# Patient Record
Sex: MALE | Race: WHITE | NOT HISPANIC OR LATINO | URBAN - METROPOLITAN AREA
[De-identification: names, ages, dates, MRNs, and addresses within clinical notes are randomized per-mention and may not be internally consistent; named-entity substitution may affect disease eponyms.]

---

## 2017-09-19 PROBLEM — Z00.00 ENCOUNTER FOR PREVENTIVE HEALTH EXAMINATION: Status: ACTIVE | Noted: 2017-09-19

## 2018-09-19 ENCOUNTER — INPATIENT (INPATIENT)
Facility: HOSPITAL | Age: 73
LOS: 1 days | Discharge: HOME | DRG: 378 | End: 2018-09-21
Attending: HOSPITALIST | Admitting: HOSPITALIST
Payer: MEDICARE

## 2018-09-19 VITALS — WEIGHT: 210.1 LBS | HEIGHT: 68 IN

## 2018-09-19 DIAGNOSIS — K92.2 GASTROINTESTINAL HEMORRHAGE, UNSPECIFIED: ICD-10-CM

## 2018-09-19 LAB
ABO RH CONFIRMATION: SIGNIFICANT CHANGE UP
ALBUMIN SERPL ELPH-MCNC: 4.4 G/DL — SIGNIFICANT CHANGE UP (ref 3.3–5.2)
ALP SERPL-CCNC: 33 U/L — LOW (ref 40–120)
ALT FLD-CCNC: 15 U/L — SIGNIFICANT CHANGE UP
ANION GAP SERPL CALC-SCNC: 13 MMOL/L — SIGNIFICANT CHANGE UP (ref 5–17)
APPEARANCE UR: CLEAR — SIGNIFICANT CHANGE UP
APTT BLD: 25 SEC — LOW (ref 27.5–37.4)
AST SERPL-CCNC: 10 U/L — SIGNIFICANT CHANGE UP
BACTERIA # UR AUTO: NEGATIVE — SIGNIFICANT CHANGE UP
BILIRUB SERPL-MCNC: 0.3 MG/DL — LOW (ref 0.4–2)
BILIRUB UR-MCNC: NEGATIVE — SIGNIFICANT CHANGE UP
BLD GP AB SCN SERPL QL: SIGNIFICANT CHANGE UP
BUN SERPL-MCNC: 41 MG/DL — HIGH (ref 8–20)
CALCIUM SERPL-MCNC: 8.8 MG/DL — SIGNIFICANT CHANGE UP (ref 8.6–10.2)
CHLORIDE SERPL-SCNC: 109 MMOL/L — HIGH (ref 98–107)
CO2 SERPL-SCNC: 22 MMOL/L — SIGNIFICANT CHANGE UP (ref 22–29)
COLOR SPEC: YELLOW — SIGNIFICANT CHANGE UP
CREAT SERPL-MCNC: 0.84 MG/DL — SIGNIFICANT CHANGE UP (ref 0.5–1.3)
DIFF PNL FLD: NEGATIVE — SIGNIFICANT CHANGE UP
EPI CELLS # UR: NEGATIVE — SIGNIFICANT CHANGE UP
GLUCOSE SERPL-MCNC: 153 MG/DL — HIGH (ref 70–115)
GLUCOSE UR QL: NEGATIVE MG/DL — SIGNIFICANT CHANGE UP
HCT VFR BLD CALC: 21.3 % — LOW (ref 42–52)
HGB BLD-MCNC: 6.8 G/DL — CRITICAL LOW (ref 14–18)
INR BLD: 1.08 RATIO — SIGNIFICANT CHANGE UP (ref 0.88–1.16)
KETONES UR-MCNC: NEGATIVE — SIGNIFICANT CHANGE UP
LEUKOCYTE ESTERASE UR-ACNC: NEGATIVE — SIGNIFICANT CHANGE UP
MAGNESIUM SERPL-MCNC: 2 MG/DL — SIGNIFICANT CHANGE UP (ref 1.6–2.6)
MCHC RBC-ENTMCNC: 29.6 PG — SIGNIFICANT CHANGE UP (ref 27–31)
MCHC RBC-ENTMCNC: 31.9 G/DL — LOW (ref 32–36)
MCV RBC AUTO: 92.6 FL — SIGNIFICANT CHANGE UP (ref 80–94)
NITRITE UR-MCNC: NEGATIVE — SIGNIFICANT CHANGE UP
NT-PROBNP SERPL-SCNC: 60 PG/ML — SIGNIFICANT CHANGE UP (ref 0–300)
OB PNL STL: POSITIVE
PH UR: 5 — SIGNIFICANT CHANGE UP (ref 5–8)
PLATELET # BLD AUTO: 221 K/UL — SIGNIFICANT CHANGE UP (ref 150–400)
POTASSIUM SERPL-MCNC: 4.2 MMOL/L — SIGNIFICANT CHANGE UP (ref 3.5–5.3)
POTASSIUM SERPL-SCNC: 4.2 MMOL/L — SIGNIFICANT CHANGE UP (ref 3.5–5.3)
PROT SERPL-MCNC: 6.2 G/DL — LOW (ref 6.6–8.7)
PROT UR-MCNC: 15 MG/DL
PROTHROM AB SERPL-ACNC: 11.9 SEC — SIGNIFICANT CHANGE UP (ref 9.8–12.7)
RBC # BLD: 2.3 M/UL — LOW (ref 4.6–6.2)
RBC # FLD: 13.9 % — SIGNIFICANT CHANGE UP (ref 11–15.6)
RBC CASTS # UR COMP ASSIST: SIGNIFICANT CHANGE UP /HPF (ref 0–4)
SODIUM SERPL-SCNC: 144 MMOL/L — SIGNIFICANT CHANGE UP (ref 135–145)
SP GR SPEC: 1.02 — SIGNIFICANT CHANGE UP (ref 1.01–1.02)
TROPONIN T SERPL-MCNC: <0.01 NG/ML — SIGNIFICANT CHANGE UP (ref 0–0.06)
TYPE + AB SCN PNL BLD: SIGNIFICANT CHANGE UP
UROBILINOGEN FLD QL: NEGATIVE MG/DL — SIGNIFICANT CHANGE UP
WBC # BLD: 10.9 K/UL — HIGH (ref 4.8–10.8)
WBC # FLD AUTO: 10.9 K/UL — HIGH (ref 4.8–10.8)
WBC UR QL: SIGNIFICANT CHANGE UP

## 2018-09-19 PROCEDURE — 99223 1ST HOSP IP/OBS HIGH 75: CPT

## 2018-09-19 PROCEDURE — 71045 X-RAY EXAM CHEST 1 VIEW: CPT | Mod: 26

## 2018-09-19 PROCEDURE — 93010 ELECTROCARDIOGRAM REPORT: CPT

## 2018-09-19 PROCEDURE — 99291 CRITICAL CARE FIRST HOUR: CPT

## 2018-09-19 RX ORDER — ACETAMINOPHEN 500 MG
650 TABLET ORAL EVERY 6 HOURS
Qty: 0 | Refills: 0 | Status: DISCONTINUED | OUTPATIENT
Start: 2018-09-19 | End: 2018-09-21

## 2018-09-19 RX ORDER — ASPIRIN/CALCIUM CARB/MAGNESIUM 324 MG
325 TABLET ORAL ONCE
Qty: 0 | Refills: 0 | Status: DISCONTINUED | OUTPATIENT
Start: 2018-09-19 | End: 2018-09-19

## 2018-09-19 RX ORDER — PANTOPRAZOLE SODIUM 20 MG/1
8 TABLET, DELAYED RELEASE ORAL
Qty: 80 | Refills: 0 | Status: DISCONTINUED | OUTPATIENT
Start: 2018-09-19 | End: 2018-09-20

## 2018-09-19 RX ORDER — SODIUM CHLORIDE 9 MG/ML
1000 INJECTION INTRAMUSCULAR; INTRAVENOUS; SUBCUTANEOUS
Qty: 0 | Refills: 0 | Status: DISCONTINUED | OUTPATIENT
Start: 2018-09-19 | End: 2018-09-20

## 2018-09-19 RX ORDER — HYDRALAZINE HCL 50 MG
10 TABLET ORAL EVERY 6 HOURS
Qty: 0 | Refills: 0 | Status: DISCONTINUED | OUTPATIENT
Start: 2018-09-19 | End: 2018-09-21

## 2018-09-19 RX ORDER — IPRATROPIUM/ALBUTEROL SULFATE 18-103MCG
3 AEROSOL WITH ADAPTER (GRAM) INHALATION ONCE
Qty: 0 | Refills: 0 | Status: COMPLETED | OUTPATIENT
Start: 2018-09-19 | End: 2018-09-19

## 2018-09-19 RX ORDER — PANTOPRAZOLE SODIUM 20 MG/1
80 TABLET, DELAYED RELEASE ORAL ONCE
Qty: 0 | Refills: 0 | Status: COMPLETED | OUTPATIENT
Start: 2018-09-19 | End: 2018-09-19

## 2018-09-19 RX ORDER — LANOLIN ALCOHOL/MO/W.PET/CERES
3 CREAM (GRAM) TOPICAL ONCE
Qty: 0 | Refills: 0 | Status: COMPLETED | OUTPATIENT
Start: 2018-09-19 | End: 2018-09-19

## 2018-09-19 RX ADMIN — Medication 3 MILLIGRAM(S): at 23:52

## 2018-09-19 RX ADMIN — PANTOPRAZOLE SODIUM 10 MG/HR: 20 TABLET, DELAYED RELEASE ORAL at 16:11

## 2018-09-19 RX ADMIN — Medication 3 MILLILITER(S): at 14:42

## 2018-09-19 RX ADMIN — PANTOPRAZOLE SODIUM 80 MILLIGRAM(S): 20 TABLET, DELAYED RELEASE ORAL at 16:12

## 2018-09-19 RX ADMIN — SODIUM CHLORIDE 125 MILLILITER(S): 9 INJECTION INTRAMUSCULAR; INTRAVENOUS; SUBCUTANEOUS at 16:12

## 2018-09-19 NOTE — ED PROVIDER NOTE - PROGRESS NOTE DETAILS
cbc cmp trop; duoneb for sob; guaiac; type and screen; patient states sob resolved while laying down; will reassess patient after above done;

## 2018-09-19 NOTE — ED ADULT NURSE NOTE - NSIMPLEMENTINTERV_GEN_ALL_ED
Implemented All Universal Safety Interventions:  [facility] to call system. Call bell, personal items and telephone within reach. Instruct patient to call for assistance. Room bathroom lighting operational. Non-slip footwear when patient is off stretcher. Physically safe environment: no spills, clutter or unnecessary equipment. Stretcher in lowest position, wheels locked, appropriate side rails in place.

## 2018-09-19 NOTE — H&P ADULT - NSHPPHYSICALEXAM_GEN_ALL_CORE
PHYSICAL EXAM:    Vital Signs Last 24 Hrs  T(C): 37.2 (19 Sep 2018 12:35), Max: 37.2 (19 Sep 2018 12:35)  T(F): 98.9 (19 Sep 2018 12:35), Max: 98.9 (19 Sep 2018 12:35)  HR: 107 (19 Sep 2018 12:35) (107 - 107)  BP: 117/73 (19 Sep 2018 12:35) (117/73 - 117/73)  BP(mean): --  RR: 18 (19 Sep 2018 12:35) (18 - 18)  SpO2: 100% (19 Sep 2018 12:35) (100% - 100%)    GENERAL: Pt lying comfortably, NAD.  ENMT: PERRL, +EOMI.  NECK: soft, Supple, No JVD,   CHEST/LUNG: Clear to auscultation bilaterally; No wheezing.  HEART: S1S2+, Regular rate and rhythm; No murmurs.  ABDOMEN: Soft, Nontender, Nondistended; Bowel sounds present.  MUSCULOSKELETAL: Normal range of motion.  SKIN: No rashes or lesions.  NEURO: AAOX3, no focal deficits, no motor r sensory loss.  PSYCH: normal mood.

## 2018-09-19 NOTE — H&P ADULT - NSHPSOCIALHISTORY_GEN_ALL_CORE
Smoker +, non ETOH, no recreational drugs. Active Smoker + 1PPD for 60 years, non ETOH, no recreational drugs.

## 2018-09-19 NOTE — ED PROVIDER NOTE - ATTENDING CONTRIBUTION TO CARE
The patient seen and examined.  The patient presents with SOB fatigue and near syncopal episode.  The patient appears pale and weak and Hb is only 6.8 with +stool occult blood    Impression: Most likely NSAIDS induced GI bleed    I, Odell Burleson, performed the initial face to face bedside interview with this patient regarding history of present illness, review of symptoms and relevant past medical, social and family history.  I completed an independent physical examination.  I was the initial provider who evaluated this patient. I have signed out the follow up of any pending tests (i.e. labs, radiological studies) to the resident.  I have communicated the patient’s plan of care and disposition with the resident.

## 2018-09-19 NOTE — ED PROVIDER NOTE - GASTROINTESTINAL, MLM
Abdomen soft, non-tender, no guarding.; rectal exam performed with Dr Burleson in the room ([facility])

## 2018-09-19 NOTE — CONSULT NOTE ADULT - SUBJECTIVE AND OBJECTIVE BOX
Patient is a 73y old  Male who presents with a chief complaint of SOB/ Anemia / GI bleed (19 Sep 2018 15:17)      HPI:  Pt is 72 y/o male active smoker, HLD, bipolar disorder, chronic back pain, arthritis, gout, taking Advil for 3 months daily, presented to ED with SOB for 2 days and dark black stool. Pt reports he started having black stool a week prior and yesterday he started having SOB with dizziness on walking, not at rest, sx progressive and decided to came to ER. He offers no other complaints, no fever, chills, nausea, vomiting, headache, chest pain, palpitation. Bowel habits are normal. (19 Sep 2018 15:17). He states that he has been taking Indocins and Aleves daily for chronic back pain that worsened after a car trip back from [place]. He states that he has had no previous EGD's but has had colonoscopies in the past and his last colonoscopy was done roughly 5 years ago. He had no associated abdominal pain or N/V/ hematemesis with the above black stools. No gross hematochezia.      REVIEW OF SYSTEMS:  Constitutional: No fever, weight loss +fatigue, + dizziness  ENMT:  No difficulty hearing, tinnitus, vertigo; No sinus or throat pain  Respiratory: No cough, wheezing, chills or hemoptysis  Cardiovascular: No chest pain, palpitations, or leg swelling  Gastrointestinal:  As per HPI. No abdominal or epigastric pain. No nausea, vomiting or hematemesis; No diarrhea or constipation. No gross hematochezia.  Skin: No itching, burning, rashes or lesions   Musculoskeletal: No joint pain or swelling; No muscle, back or extremity pain  Patient has no cardiopulmonary, peripheral vascular, musculoskeletal, dermatological, neurological, or psychological symptoms or complaints at this time.    PAST MEDICAL & SURGICAL HISTORY:  Hypertension  Gout  Hyperlipidemia  Bilateral cataract extractions      FAMILY HISTORY:  No pertinent family history in first degree relatives      SOCIAL HISTORY:  Smoking Status: [ x] Current, [ ] Former, [ ] Never  Pack Years: 49, ETOH 2 cocktails daily, no drug use history.    MEDICATIONS:  MEDICATIONS  (STANDING):  pantoprazole Infusion 8 mG/Hr (10 mL/Hr) IV Continuous <Continuous>  sodium chloride 0.9%. 1000 milliLiter(s) (125 mL/Hr) IV Continuous <Continuous>    MEDICATIONS  (PRN):  acetaminophen   Tablet .. 650 milliGRAM(s) Oral every 6 hours PRN Temp greater or equal to 38C (100.4F), Mild Pain (1 - 3)  hydrALAZINE Injectable 10 milliGRAM(s) IV Push every 6 hours PRN SBP>170      Allergies    No Known Allergies    Intolerances        Vital Signs Last 24 Hrs  T(C): 36.9 (19 Sep 2018 17:00), Max: 37.2 (19 Sep 2018 12:35)  T(F): 98.4 (19 Sep 2018 17:00), Max: 98.9 (19 Sep 2018 12:35)  HR: 97 (19 Sep 2018 17:00) (97 - 107)  BP: 145/67 (19 Sep 2018 17:00) (117/73 - 145/67)  BP(mean): --  RR: 18 (19 Sep 2018 17:00) (18 - 18)  SpO2: 99% (19 Sep 2018 17:00) (99% - 100%)        PHYSICAL EXAM:    General: Well developed; well nourished; in no acute distress  HEENT: MMM, conjunctiva pale and sclera anicteric.  Lungs: Clear bilaterally.  Cor: RRR S1, S2 only  Abdomen: Soft, non-tender non-distended; Normal bowel sounds; No rebound or guarding or HSM  YAN: Dark brown stool, Hemoccult +, no melena or blood.  Extremities: Normal range of motion, No clubbing, cyanosis or edema  Neurological: Alert and oriented x3  Skin: Warm and dry. No obvious rash      LABS:                        6.8    10.9  )-----------( 221      ( 19 Sep 2018 13:12 )             21.3     09-19    144  |  109<H>  |  41.0<H>  ----------------------------<  153<H>  4.2   |  22.0  |  0.84    Ca    8.8      19 Sep 2018 13:12  Mg     2.0     09-19    TPro  6.2<L>  /  Alb  4.4  /  TBili  0.3<L>  /  DBili  x   /  AST  10  /  ALT  15  /  AlkPhos  33<L>  09-19          RADIOLOGY & ADDITIONAL STUDIES:

## 2018-09-19 NOTE — ED ADULT NURSE NOTE - OBJECTIVE STATEMENT
Patient reports sudden onset of dizziness and SOB that started yesterday, LOPEZ, also states has a lot back pain that started last week and has been taking a lot of NSAIDS.  Patient reports black tarry stool.  PAtient is everyday smoker.

## 2018-09-19 NOTE — ED PROVIDER NOTE - CARE PLAN
Principal Discharge DX:	GI bleed due to NSAIDs  Secondary Diagnosis:	Gout  Secondary Diagnosis:	Hyperlipidemia

## 2018-09-19 NOTE — ED PROVIDER NOTE - OBJECTIVE STATEMENT
73 year old male with pmh including HLD, HTN, gout (on colchicine and advil) coming to  ED with complaints of SOB. States this morning when tried to get up felt like was going to fall over and got short of breath. states when rested felt better. states has been having dark tarry stools for past few days. also stating that has been smoking 1 ppd x 62 years.

## 2018-09-19 NOTE — CONSULT NOTE ADULT - PROBLEM SELECTOR RECOMMENDATION 9
Pt with melena by history w/o obvious melena or GI bleeding presently. Rule out NSAID induced ulcer disease +/or gastropathy. Transfuse to Hb of greater than 7 grams. IV Pantoprazole. NPO after MN tonight for EGD tomorrow. Pt and his wife were informed of and are agreeable to the above management plan. Repeat labs ordered for the AM.

## 2018-09-19 NOTE — H&P ADULT - HISTORY OF PRESENT ILLNESS
Pt is 74 y/o male active smoker, HLD, bipolar disorder, chronic back pain, arthritis, gout, taking Advil for 3 months daily, presented to ED with SOB for 2 days and dark black stool. Pt reports he started having black stool a week prior and yesterday he started having SOB with dizziness on walking, not at rest, sx progressive and decided to came to ER. He offers no other complaints, no fever, chills, nausea, vomiting, headache, chest pain, palpitation. Bowel habits are normal.

## 2018-09-19 NOTE — H&P ADULT - NSHPLABSRESULTS_GEN_ALL_CORE
LABS:                        6.8    10.9  )-----------( 221      ( 19 Sep 2018 13:12 )             21.3     09-19    144  |  109<H>  |  41.0<H>  ----------------------------<  153<H>  4.2   |  22.0  |  0.84    Ca    8.8      19 Sep 2018 13:12  Mg     2.0     09-19    TPro  6.2<L>  /  Alb  4.4  /  TBili  0.3<L>  /  DBili  x   /  AST  10  /  ALT  15  /  AlkPhos  33<L>  09-19    PT/INR - ( 19 Sep 2018 13:12 )   PT: 11.9 sec;   INR: 1.08 ratio         PTT - ( 19 Sep 2018 13:12 )  PTT:25.0 sec    LIVER FUNCTIONS - ( 19 Sep 2018 13:12 )  Alb: 4.4 g/dL / Pro: 6.2 g/dL / ALK PHOS: 33 U/L / ALT: 15 U/L / AST: 10 U/L / GGT: x               CARDIAC MARKERS ( 19 Sep 2018 13:12 )  x     / <0.01 ng/mL / x     / x     / x

## 2018-09-20 ENCOUNTER — RESULT REVIEW (OUTPATIENT)
Age: 73
End: 2018-09-20

## 2018-09-20 LAB
ALBUMIN SERPL ELPH-MCNC: 3.1 G/DL — LOW (ref 3.3–5.2)
ALP SERPL-CCNC: 30 U/L — LOW (ref 40–120)
ALT FLD-CCNC: 11 U/L — SIGNIFICANT CHANGE UP
ANION GAP SERPL CALC-SCNC: 10 MMOL/L — SIGNIFICANT CHANGE UP (ref 5–17)
ANISOCYTOSIS BLD QL: SLIGHT — SIGNIFICANT CHANGE UP
AST SERPL-CCNC: 9 U/L — SIGNIFICANT CHANGE UP
BASOPHILS # BLD AUTO: 0 K/UL — SIGNIFICANT CHANGE UP (ref 0–0.2)
BASOPHILS NFR BLD AUTO: 0.2 % — SIGNIFICANT CHANGE UP (ref 0–2)
BILIRUB SERPL-MCNC: 0.5 MG/DL — SIGNIFICANT CHANGE UP (ref 0.4–2)
BUN SERPL-MCNC: 29 MG/DL — HIGH (ref 8–20)
CALCIUM SERPL-MCNC: 7.9 MG/DL — LOW (ref 8.6–10.2)
CHLORIDE SERPL-SCNC: 112 MMOL/L — HIGH (ref 98–107)
CO2 SERPL-SCNC: 21 MMOL/L — LOW (ref 22–29)
CREAT SERPL-MCNC: 0.84 MG/DL — SIGNIFICANT CHANGE UP (ref 0.5–1.3)
ELLIPTOCYTES BLD QL SMEAR: SLIGHT — SIGNIFICANT CHANGE UP
EOSINOPHIL # BLD AUTO: 0.1 K/UL — SIGNIFICANT CHANGE UP (ref 0–0.5)
EOSINOPHIL NFR BLD AUTO: 1.3 % — SIGNIFICANT CHANGE UP (ref 0–5)
FERRITIN SERPL-MCNC: 121 NG/ML — SIGNIFICANT CHANGE UP (ref 30–400)
FOLATE SERPL-MCNC: 6.4 NG/ML — SIGNIFICANT CHANGE UP
GLUCOSE SERPL-MCNC: 107 MG/DL — SIGNIFICANT CHANGE UP (ref 70–115)
HCT VFR BLD CALC: 23.3 % — LOW (ref 42–52)
HGB BLD-MCNC: 7.6 G/DL — LOW (ref 14–18)
HYPOCHROMIA BLD QL: SLIGHT — SIGNIFICANT CHANGE UP
IRON SATN MFR SERPL: 111 UG/DL — SIGNIFICANT CHANGE UP (ref 59–158)
IRON SATN MFR SERPL: 36 % — SIGNIFICANT CHANGE UP (ref 16–55)
LYMPHOCYTES # BLD AUTO: 2.4 K/UL — SIGNIFICANT CHANGE UP (ref 1–4.8)
LYMPHOCYTES # BLD AUTO: 26.4 % — SIGNIFICANT CHANGE UP (ref 20–55)
MACROCYTES BLD QL: SLIGHT — SIGNIFICANT CHANGE UP
MAGNESIUM SERPL-MCNC: 1.9 MG/DL — SIGNIFICANT CHANGE UP (ref 1.6–2.6)
MCHC RBC-ENTMCNC: 29.8 PG — SIGNIFICANT CHANGE UP (ref 27–31)
MCHC RBC-ENTMCNC: 32.6 G/DL — SIGNIFICANT CHANGE UP (ref 32–36)
MCV RBC AUTO: 91.4 FL — SIGNIFICANT CHANGE UP (ref 80–94)
MICROCYTES BLD QL: SLIGHT — SIGNIFICANT CHANGE UP
MONOCYTES # BLD AUTO: 1 K/UL — HIGH (ref 0–0.8)
MONOCYTES NFR BLD AUTO: 11.2 % — HIGH (ref 3–10)
NEUTROPHILS # BLD AUTO: 5.4 K/UL — SIGNIFICANT CHANGE UP (ref 1.8–8)
NEUTROPHILS NFR BLD AUTO: 60.2 % — SIGNIFICANT CHANGE UP (ref 37–73)
OVALOCYTES BLD QL SMEAR: SLIGHT — SIGNIFICANT CHANGE UP
PLAT MORPH BLD: NORMAL — SIGNIFICANT CHANGE UP
PLATELET # BLD AUTO: 164 K/UL — SIGNIFICANT CHANGE UP (ref 150–400)
POIKILOCYTOSIS BLD QL AUTO: SLIGHT — SIGNIFICANT CHANGE UP
POLYCHROMASIA BLD QL SMEAR: SLIGHT — SIGNIFICANT CHANGE UP
POTASSIUM SERPL-MCNC: 3.7 MMOL/L — SIGNIFICANT CHANGE UP (ref 3.5–5.3)
POTASSIUM SERPL-SCNC: 3.7 MMOL/L — SIGNIFICANT CHANGE UP (ref 3.5–5.3)
PROT SERPL-MCNC: 5.3 G/DL — LOW (ref 6.6–8.7)
RBC # BLD: 2.55 M/UL — LOW (ref 4.6–6.2)
RBC # FLD: 14.8 % — SIGNIFICANT CHANGE UP (ref 11–15.6)
RBC BLD AUTO: ABNORMAL
SCHISTOCYTES BLD QL AUTO: SLIGHT — SIGNIFICANT CHANGE UP
SODIUM SERPL-SCNC: 143 MMOL/L — SIGNIFICANT CHANGE UP (ref 135–145)
STOMATOCYTES BLD QL SMEAR: PRESENT — SIGNIFICANT CHANGE UP
TIBC SERPL-MCNC: 310 UG/DL — SIGNIFICANT CHANGE UP (ref 220–430)
TRANSFERRIN SERPL-MCNC: 217 MG/DL — SIGNIFICANT CHANGE UP (ref 180–329)
VIT B12 SERPL-MCNC: 301 PG/ML — SIGNIFICANT CHANGE UP (ref 232–1245)
WBC # BLD: 8.9 K/UL — SIGNIFICANT CHANGE UP (ref 4.8–10.8)
WBC # FLD AUTO: 8.9 K/UL — SIGNIFICANT CHANGE UP (ref 4.8–10.8)

## 2018-09-20 PROCEDURE — 88305 TISSUE EXAM BY PATHOLOGIST: CPT | Mod: 26

## 2018-09-20 PROCEDURE — 88342 IMHCHEM/IMCYTCHM 1ST ANTB: CPT | Mod: 26

## 2018-09-20 PROCEDURE — 43239 EGD BIOPSY SINGLE/MULTIPLE: CPT

## 2018-09-20 PROCEDURE — 99232 SBSQ HOSP IP/OBS MODERATE 35: CPT

## 2018-09-20 RX ORDER — OXYCODONE HYDROCHLORIDE 5 MG/1
5 TABLET ORAL EVERY 6 HOURS
Qty: 0 | Refills: 0 | Status: DISCONTINUED | OUTPATIENT
Start: 2018-09-20 | End: 2018-09-21

## 2018-09-20 RX ORDER — RISPERIDONE 4 MG/1
0.5 TABLET ORAL
Qty: 0 | Refills: 0 | Status: DISCONTINUED | OUTPATIENT
Start: 2018-09-20 | End: 2018-09-21

## 2018-09-20 RX ORDER — POTASSIUM CHLORIDE 20 MEQ
40 PACKET (EA) ORAL ONCE
Qty: 0 | Refills: 0 | Status: COMPLETED | OUTPATIENT
Start: 2018-09-20 | End: 2018-09-20

## 2018-09-20 RX ORDER — OXYCODONE HYDROCHLORIDE 5 MG/1
5 TABLET ORAL ONCE
Qty: 0 | Refills: 0 | Status: DISCONTINUED | OUTPATIENT
Start: 2018-09-20 | End: 2018-09-20

## 2018-09-20 RX ORDER — ATORVASTATIN CALCIUM 80 MG/1
10 TABLET, FILM COATED ORAL AT BEDTIME
Qty: 0 | Refills: 0 | Status: DISCONTINUED | OUTPATIENT
Start: 2018-09-20 | End: 2018-09-21

## 2018-09-20 RX ORDER — LANOLIN ALCOHOL/MO/W.PET/CERES
3 CREAM (GRAM) TOPICAL ONCE
Qty: 0 | Refills: 0 | Status: COMPLETED | OUTPATIENT
Start: 2018-09-20 | End: 2018-09-21

## 2018-09-20 RX ORDER — PANTOPRAZOLE SODIUM 20 MG/1
40 TABLET, DELAYED RELEASE ORAL
Qty: 0 | Refills: 0 | Status: DISCONTINUED | OUTPATIENT
Start: 2018-09-20 | End: 2018-09-21

## 2018-09-20 RX ADMIN — SODIUM CHLORIDE 125 MILLILITER(S): 9 INJECTION INTRAMUSCULAR; INTRAVENOUS; SUBCUTANEOUS at 02:02

## 2018-09-20 RX ADMIN — PANTOPRAZOLE SODIUM 10 MG/HR: 20 TABLET, DELAYED RELEASE ORAL at 02:01

## 2018-09-20 RX ADMIN — Medication 40 MILLIEQUIVALENT(S): at 12:35

## 2018-09-20 RX ADMIN — RISPERIDONE 0.5 MILLIGRAM(S): 4 TABLET ORAL at 19:11

## 2018-09-20 RX ADMIN — OXYCODONE HYDROCHLORIDE 5 MILLIGRAM(S): 5 TABLET ORAL at 19:09

## 2018-09-20 RX ADMIN — OXYCODONE HYDROCHLORIDE 5 MILLIGRAM(S): 5 TABLET ORAL at 16:34

## 2018-09-20 RX ADMIN — PANTOPRAZOLE SODIUM 40 MILLIGRAM(S): 20 TABLET, DELAYED RELEASE ORAL at 19:11

## 2018-09-20 NOTE — PROGRESS NOTE ADULT - ASSESSMENT
Pt is 74 y/o male active smoker, HLD, bipolar disorder, chronic back pain, arthritis, gout, taking Advil for 3 months daily, presented to ED with black stool for 1 week and SOB/ dizziness for 2 days. In ED noted to have positive Occult blood and hemoglobin 6.8. Admitted for blood loss anemia possible UGI bleeding likely from NSAIDs. Gi evaluated pt, S/p EGD today which showed duodenal bulb ulcer but no active bleeding. S/p pRBCs 2 units transfusion with no appropriate response.          >Acute blood loss anemia / R/o GI bleed:  - Hx of NSAIDs use.  - S/p EGD which showed duodenal bulb ulcer but no active bleeding.  - Diet restarted, c/w PPI BID for 3 months at least.   - Hgb 7.8- no appropriate response after 2 units pRBC, will give 1 more unit pRBC today.     >H/o HLD/ ? HTN / Bipolar disorder/ Gout:  - Unknown home meds, BP being stable.   - P does not remember, will resume once verified.      >Chronic severe back pain / Arthritis  - Avoid NSAIDS, Keep on Tylenol    >DVT ppx- SCD, avoid chemical AC.

## 2018-09-20 NOTE — BRIEF OPERATIVE NOTE - OPERATION/FINDINGS
Normal esophagus and stomach  Clean based 5 mm anterior wall duodenal bulb ulcer with marked duodenitis. No active bleeding  Random gastric biopsies performed

## 2018-09-20 NOTE — ED ADULT NURSE REASSESSMENT NOTE - NS ED NURSE REASSESS COMMENT FT1
1st unit ordered PRBC's completed, negative AR. Patient A&Ox4 denies any pain or discomfort. Will continue to monitor.
2nd unit PRBC completed, negative AR. Patient A&Ox4, denies any pain or discomfort. Denies any chest pain, shortness of breath, nausea or dizziness. Respirations even & unlabored, denies any numbness or tingling. Cardiac monitor in place, NSR. Saline lock in place, patent, negative s/s phlebitis or infiltration. IVF & medication in progress as ordered, well tolerated. Patient assisted into position of comfort. Will continue to monitor.
Patient resting comfortably in bed, BP stable, 1st unit of PRBC infusing, discussed plan of care with patient and wife, will continue to monitor.
Report received from off going RN, charting as noted. Patient A&Ox4, denies any pain or discomfort. Cardiac monitor in place, NSR. IVF & medication in progress, well tolerated. 1 unit PRBC in progress, negative AR. Respirations even & unlabored. Denies any numbness/tingling to extremities. Denies any chest pain, shortness of breath, nausea or dizziness. Patient aware of NPO status. Saline locks in place, patent, negative s/s phlebitis or infiltration. Plan of care discussed, all questions answered. Will monitor.
Second unit PRBC in progress as ordered. Negative AR. Patient A&Ox4, denies any pain or discomfort. Will continue to monitor.
pt c/o pain to his back from the stretcher, Dr Phillips made aware , per verbal order pt to be given oxycodone 5IR x 1. Other meds to be ordered when wife provides pts meds
pt s/p GI testing, per report he tolerated well. pt placed back on monitor ,Protonix and NS. pt enquiring about eating. will continue to monitor
Patient denies any pain or discomfort, Cardiac monitor in place, NSR. SpO2 99% room air. Saline lock in place, patent, negative s/s phlebitis or infiltration. IVF & Medications in progress as ordered, well tolerated. Will continue to monitor.
pt awake and alert with no complaints of pain or discomfort at this time. lung sounds clear with no SOB noted. pt states there is no dizziness. skin is warm and pink and IV infusing well with no signs of infiltration. pt leaving unit for GI testing. will continue to monitor.

## 2018-09-20 NOTE — PROGRESS NOTE ADULT - SUBJECTIVE AND OBJECTIVE BOX
KEYANA PALMA Male 73y [id number]    Patient is a 73y old  Male who presents with a chief complaint of SOB/ Anemia / GI bleed (19 Sep 2018 17:21)      Subjective/objective:  Pt seen and examined at bedside, no over night event reported by night staff. Pt states SOB /dizziness resolved, s/p EGD today which showed duodenal bulb ulcer but no active bleeding. S/p pRBCs 2 units transfusion with no appropriate response.     Review of system:  No fever, chills, nausea, vomiting, headache, dizziness, chest pain, SOB or palpitation.      PHYSICAL EXAM:    Vital Signs Last 24 Hrs  T(C): 36.9 (20 Sep 2018 12:04), Max: 37.2 (19 Sep 2018 12:35)  T(F): 98.5 (20 Sep 2018 12:04), Max: 98.9 (19 Sep 2018 12:35)  HR: 79 (20 Sep 2018 12:04) (76 - 107)  BP: 129/68 (20 Sep 2018 12:04) (115/69 - 145/68)  BP(mean): --  RR: 18 (20 Sep 2018 12:04) (15 - 20)  SpO2: 98% (20 Sep 2018 12:04) (97% - 100%)    GENERAL: Pt lying comfortably, NAD.  CHEST/LUNG: Clear to auscultation bilaterally; No wheezing.  HEART: S1S2+, Regular rate and rhythm; No murmurs.  ABDOMEN: Soft, Nontender, Nondistended; Bowel sounds present.  Extremities: No LE edema, pulses +  NEURO: AAOX3, no focal deficits, no motor r sensory loss.  PSYCH: normal mood.          MEDICATIONS  (STANDING):  pantoprazole    Tablet 40 milliGRAM(s) Oral two times a day  potassium chloride    Tablet ER 40 milliEquivalent(s) Oral once    MEDICATIONS  (PRN):  acetaminophen   Tablet .. 650 milliGRAM(s) Oral every 6 hours PRN Temp greater or equal to 38C (100.4F), Mild Pain (1 - 3)  hydrALAZINE Injectable 10 milliGRAM(s) IV Push every 6 hours PRN SBP>170        Labs:  LABS:                        7.6    8.9   )-----------( 164      ( 20 Sep 2018 06:36 )             23.3     09-20    143  |  112<H>  |  29.0<H>  ----------------------------<  107  3.7   |  21.0<L>  |  0.84    Ca    7.9<L>      20 Sep 2018 06:37  Mg     1.9         TPro  5.3<L>  /  Alb  3.1<L>  /  TBili  0.5  /  DBili  x   /  AST  9   /  ALT  11  /  AlkPhos  30<L>      PT/INR - ( 19 Sep 2018 13:12 )   PT: 11.9 sec;   INR: 1.08 ratio         PTT - ( 19 Sep 2018 13:12 )  PTT:25.0 sec    LIVER FUNCTIONS - ( 20 Sep 2018 06:37 )  Alb: 3.1 g/dL / Pro: 5.3 g/dL / ALK PHOS: 30 U/L / ALT: 11 U/L / AST: 9 U/L / GGT: x           Urinalysis Basic - ( 19 Sep 2018 20:26 )    Color: Yellow / Appearance: Clear / S.020 / pH: x  Gluc: x / Ketone: Negative  / Bili: Negative / Urobili: Negative mg/dL   Blood: x / Protein: 15 mg/dL / Nitrite: Negative   Leuk Esterase: Negative / RBC: 0-2 /HPF / WBC 0-2   Sq Epi: x / Non Sq Epi: Negative / Bacteria: Negative        CARDIAC MARKERS ( 19 Sep 2018 13:12 )  x     / <0.01 ng/mL / x     / x     / x

## 2018-09-20 NOTE — BRIEF OPERATIVE NOTE - PRE-OP DX
Acute post-hemorrhagic anemia  09/20/2018    Active  Vikas Howard  Melena  09/20/2018    Active  Vikas Howard

## 2018-09-20 NOTE — BRIEF OPERATIVE NOTE - COMMENTS
PPI bid  Advance diet  Avoid NSAIDs  Follow up in office for biopsy results.   PPI bid for 3 months at least

## 2018-09-20 NOTE — ED ADULT NURSE REASSESSMENT NOTE - GENERAL PATIENT STATE
comfortable appearance/cooperative
comfortable appearance/cooperative
comfortable appearance/improvement verbalized

## 2018-09-21 ENCOUNTER — TRANSCRIPTION ENCOUNTER (OUTPATIENT)
Age: 73
End: 2018-09-21

## 2018-09-21 VITALS
DIASTOLIC BLOOD PRESSURE: 64 MMHG | OXYGEN SATURATION: 98 % | HEART RATE: 68 BPM | SYSTOLIC BLOOD PRESSURE: 132 MMHG | RESPIRATION RATE: 16 BRPM | TEMPERATURE: 98 F

## 2018-09-21 DIAGNOSIS — K26.9 DUODENAL ULCER, UNSPECIFIED AS ACUTE OR CHRONIC, WITHOUT HEMORRHAGE OR PERFORATION: ICD-10-CM

## 2018-09-21 LAB
HCT VFR BLD CALC: 27.6 % — LOW (ref 42–52)
HGB BLD-MCNC: 9.2 G/DL — LOW (ref 14–18)
MCHC RBC-ENTMCNC: 29.4 PG — SIGNIFICANT CHANGE UP (ref 27–31)
MCHC RBC-ENTMCNC: 33.3 G/DL — SIGNIFICANT CHANGE UP (ref 32–36)
MCV RBC AUTO: 88.2 FL — SIGNIFICANT CHANGE UP (ref 80–94)
PLATELET # BLD AUTO: 194 K/UL — SIGNIFICANT CHANGE UP (ref 150–400)
RBC # BLD: 3.13 M/UL — LOW (ref 4.6–6.2)
RBC # FLD: 16.6 % — HIGH (ref 11–15.6)
WBC # BLD: 7.9 K/UL — SIGNIFICANT CHANGE UP (ref 4.8–10.8)
WBC # FLD AUTO: 7.9 K/UL — SIGNIFICANT CHANGE UP (ref 4.8–10.8)

## 2018-09-21 PROCEDURE — P9016: CPT

## 2018-09-21 PROCEDURE — 82728 ASSAY OF FERRITIN: CPT

## 2018-09-21 PROCEDURE — 81001 URINALYSIS AUTO W/SCOPE: CPT

## 2018-09-21 PROCEDURE — 85610 PROTHROMBIN TIME: CPT

## 2018-09-21 PROCEDURE — 86901 BLOOD TYPING SEROLOGIC RH(D): CPT

## 2018-09-21 PROCEDURE — 82746 ASSAY OF FOLIC ACID SERUM: CPT

## 2018-09-21 PROCEDURE — 83880 ASSAY OF NATRIURETIC PEPTIDE: CPT

## 2018-09-21 PROCEDURE — 80053 COMPREHEN METABOLIC PANEL: CPT

## 2018-09-21 PROCEDURE — 86900 BLOOD TYPING SEROLOGIC ABO: CPT

## 2018-09-21 PROCEDURE — 88305 TISSUE EXAM BY PATHOLOGIST: CPT

## 2018-09-21 PROCEDURE — 86923 COMPATIBILITY TEST ELECTRIC: CPT

## 2018-09-21 PROCEDURE — 82607 VITAMIN B-12: CPT

## 2018-09-21 PROCEDURE — 83735 ASSAY OF MAGNESIUM: CPT

## 2018-09-21 PROCEDURE — 94640 AIRWAY INHALATION TREATMENT: CPT

## 2018-09-21 PROCEDURE — 84484 ASSAY OF TROPONIN QUANT: CPT

## 2018-09-21 PROCEDURE — 99291 CRITICAL CARE FIRST HOUR: CPT | Mod: 25

## 2018-09-21 PROCEDURE — 82272 OCCULT BLD FECES 1-3 TESTS: CPT

## 2018-09-21 PROCEDURE — 99232 SBSQ HOSP IP/OBS MODERATE 35: CPT

## 2018-09-21 PROCEDURE — 85027 COMPLETE CBC AUTOMATED: CPT

## 2018-09-21 PROCEDURE — 86850 RBC ANTIBODY SCREEN: CPT

## 2018-09-21 PROCEDURE — 85730 THROMBOPLASTIN TIME PARTIAL: CPT

## 2018-09-21 PROCEDURE — 93005 ELECTROCARDIOGRAM TRACING: CPT

## 2018-09-21 PROCEDURE — 84466 ASSAY OF TRANSFERRIN: CPT

## 2018-09-21 PROCEDURE — 99239 HOSP IP/OBS DSCHRG MGMT >30: CPT

## 2018-09-21 PROCEDURE — 36430 TRANSFUSION BLD/BLD COMPNT: CPT

## 2018-09-21 PROCEDURE — 36415 COLL VENOUS BLD VENIPUNCTURE: CPT

## 2018-09-21 PROCEDURE — 71045 X-RAY EXAM CHEST 1 VIEW: CPT

## 2018-09-21 PROCEDURE — 80048 BASIC METABOLIC PNL TOTAL CA: CPT

## 2018-09-21 PROCEDURE — 83550 IRON BINDING TEST: CPT

## 2018-09-21 PROCEDURE — 88342 IMHCHEM/IMCYTCHM 1ST ANTB: CPT

## 2018-09-21 RX ORDER — ACETAMINOPHEN 500 MG
2 TABLET ORAL
Qty: 0 | Refills: 0 | DISCHARGE
Start: 2018-09-21

## 2018-09-21 RX ORDER — PANTOPRAZOLE SODIUM 20 MG/1
1 TABLET, DELAYED RELEASE ORAL
Qty: 180 | Refills: 0
Start: 2018-09-21 | End: 2018-12-19

## 2018-09-21 RX ORDER — ASPIRIN/CALCIUM CARB/MAGNESIUM 324 MG
1 TABLET ORAL
Qty: 0 | Refills: 0 | COMMUNITY

## 2018-09-21 RX ORDER — POLYETHYLENE GLYCOL 3350 17 G/17G
17 POWDER, FOR SOLUTION ORAL DAILY
Qty: 0 | Refills: 0 | Status: DISCONTINUED | OUTPATIENT
Start: 2018-09-21 | End: 2018-09-21

## 2018-09-21 RX ORDER — POLYETHYLENE GLYCOL 3350 17 G/17G
17 POWDER, FOR SOLUTION ORAL
Qty: 0 | Refills: 0 | DISCHARGE
Start: 2018-09-21

## 2018-09-21 RX ORDER — MELOXICAM 15 MG/1
1 TABLET ORAL
Qty: 0 | Refills: 0 | COMMUNITY

## 2018-09-21 RX ORDER — PANTOPRAZOLE SODIUM 20 MG/1
1 TABLET, DELAYED RELEASE ORAL
Qty: 180 | Refills: 0 | OUTPATIENT
Start: 2018-09-21 | End: 2018-12-19

## 2018-09-21 RX ADMIN — ATORVASTATIN CALCIUM 10 MILLIGRAM(S): 80 TABLET, FILM COATED ORAL at 00:04

## 2018-09-21 RX ADMIN — Medication 3 MILLIGRAM(S): at 00:04

## 2018-09-21 RX ADMIN — RISPERIDONE 0.5 MILLIGRAM(S): 4 TABLET ORAL at 05:44

## 2018-09-21 RX ADMIN — POLYETHYLENE GLYCOL 3350 17 GRAM(S): 17 POWDER, FOR SOLUTION ORAL at 12:34

## 2018-09-21 RX ADMIN — PANTOPRAZOLE SODIUM 40 MILLIGRAM(S): 20 TABLET, DELAYED RELEASE ORAL at 05:44

## 2018-09-21 NOTE — DISCHARGE NOTE ADULT - PATIENT PORTAL LINK FT
You can access the [facility], offered by [facility], by registering with the following website: [email or web address]

## 2018-09-21 NOTE — DISCHARGE NOTE ADULT - HOSPITAL COURSE
Pt is 74 y/o male active smoker, HLD, bipolar disorder, chronic back pain, arthritis, gout, taking Advil for 3 months daily, presented to ED with black stool for 1 week and SOB/ dizziness for 2 days. In ED noted to have positive Occult blood and hemoglobin 6.8. Admitted for blood loss anemia possible UGI bleeding likely from NSAIDs. Gi evaluated pt, S/p EGD which showed duodenal bulb ulcer but no active bleeding. S/p pRBCs  units transfusion with no appropriate response, 1 more unit transfused. Pt with stable H/H today 9/21, cleared by GI for discharge. Pt to be discharged home with protonix BID for 1 month then to be taken once a day every morning there after. Will need follow up visit with Dr. Kraus as outpatient for biopsy results. Pt to refrain from taking NSAIDs/ASA for now, follow up with PMD as outpatient. Medically stable for DC home. Pt is 72 y/o male active smoker, HLD, bipolar disorder, chronic back pain, arthritis, gout, taking Advil for 3 months daily, presented to ED with black stool for 1 week and SOB/ dizziness for 2 days. In ED noted to have positive Occult blood and hemoglobin 6.8. Admitted for blood loss anemia possible UGI bleeding likely from NSAIDs. Gi evaluated pt, S/p EGD which showed duodenal bulb ulcer but no active bleeding. S/p pRBCs  units transfusion with no appropriate response, 1 more unit transfused. Pt with stable H/H today 9/21, no signs of bleeding. cleared by GI for discharge. Pt to be discharged home with protonix BID for 1 month then to be taken once a day every morning there after. Will need follow up visit with Dr. Kraus as outpatient for biopsy results. Pt to refrain from taking NSAIDs/ASA for now, follow up with PMD as outpatient. Medically stable for DC home. Pt is 72 y/o male active smoker, HLD, bipolar disorder, chronic back pain, arthritis, gout, taking Advil for 3 months daily, presented to ED with black stool for 1 week and SOB/ dizziness for 2 days. In ED noted to have positive Occult blood and hemoglobin 6.8. Admitted for blood loss anemia possible UGI bleeding likely from NSAIDs. GI evaluated pt, S/p EGD which showed duodenal bulb ulcer but no active bleeding. S/p pRBCs  units transfusion with no appropriate response, 1 more unit transfused. Pt with stable H/H today 9/21, no signs of bleeding. cleared by GI for discharge. Pt to be discharged home with protonix BID for 1 month then to be taken once a day every morning there after. Will need follow up visit with Dr. Kraus as outpatient for biopsy results. Pt to refrain from taking NSAIDs/ASA for now, follow up with PMD as outpatient. Medically stable for DC home.     PHYSICAL EXAM:    Vital Signs Last 24 Hrs  T(C): 36.6 (21 Sep 2018 10:23), Max: 36.9 (20 Sep 2018 20:32)  T(F): 97.9 (21 Sep 2018 10:23), Max: 98.4 (20 Sep 2018 20:32)  HR: 68 (21 Sep 2018 10:23) (68 - 87)  BP: 132/64 (21 Sep 2018 10:23) (122/76 - 151/87)  BP(mean): --  RR: 16 (21 Sep 2018 10:23) (16 - 20)  SpO2: 98% (21 Sep 2018 10:23) (95% - 98%)    GENERAL: Pt lying comfortably, NAD.  CHEST/LUNG: Clear to auscultatation bilaterally; No wheezing.  HEART: S1S2+, Regular rate and rhythm; No murmurs.  ABDOMEN: Soft, Nontender, Nondistended; Bowel sounds present.  NEURO: AAOX3, no focal deficits, no motor r sensory loss.  PSYCH: normal mood.    Total time spent on discharge 35 minutes.

## 2018-09-21 NOTE — PROGRESS NOTE ADULT - PROBLEM SELECTOR PLAN 2
continue pantoprazole BID for one month and then qAM. F/U with Dr. Ortega as outpatient-needs review of biopsies for H Pylori. Ok to D/C from GI standpoint. Will se only prn your request while in hospita;.

## 2018-09-21 NOTE — PROGRESS NOTE ADULT - SUBJECTIVE AND OBJECTIVE BOX
Pt seen and examined f/u GI bleed probably due to DU and duodenitis    This AM he feels well with a stable H/H and tolderating solid food without abdominal pain, nausea or vomiting. Hgb up to 9.2.     REVIEW OF SYSTEMS:    CONSTITUTIONAL: No fever, weight loss, or fatigue  EYES: No eye pain, visual disturbances, or discharge  ENMT:  No difficulty hearing, tinnitus, vertigo; No sinus or throat pain  RESPIRATORY: No cough, wheezing, chills or hemoptysis; No shortness of breath  CARDIOVASCULAR: No chest pain, palpitations, dizziness, or leg swelling  GASTROINTESTINAL: No abdominal or epigastric pain. No nausea, vomiting, or hematemesis; No diarrhea or constipation. No melena or hematochezia.    MEDICATIONS:  MEDICATIONS  (STANDING):  atorvastatin 10 milliGRAM(s) Oral at bedtime  pantoprazole    Tablet 40 milliGRAM(s) Oral two times a day  risperiDONE   Tablet 0.5 milliGRAM(s) Oral two times a day    MEDICATIONS  (PRN):  acetaminophen   Tablet .. 650 milliGRAM(s) Oral every 6 hours PRN Temp greater or equal to 38C (100.4F), Mild Pain (1 - 3)  hydrALAZINE Injectable 10 milliGRAM(s) IV Push every 6 hours PRN SBP>170  oxyCODONE    IR 5 milliGRAM(s) Oral every 6 hours PRN Moderate Pain (4 - 6)      Allergies    No Known Allergies    Intolerances        Vital Signs Last 24 Hrs  T(C): 36.5 (21 Sep 2018 04:38), Max: 37.2 (20 Sep 2018 11:00)  T(F): 97.7 (21 Sep 2018 04:38), Max: 98.9 (20 Sep 2018 11:00)  HR: 75 (21 Sep 2018 04:38) (75 - 87)  BP: 122/76 (21 Sep 2018 04:38) (118/68 - 151/87)  BP(mean): --  RR: 18 (20 Sep 2018 23:00) (18 - 20)  SpO2: 95% (20 Sep 2018 23:00) (95% - 98%)      PHYSICAL EXAM:    General: Well developed; well nourished; in no acute distress  HEENT: MMM, conjunctiva and sclera clear  Gastrointestinal:Abdomen: Soft non-tender non-distended; Normal bowel sounds; No hepatosplenomegaly  Extremities: no cyanosis, clubbing or edema.  Skin: Warm and dry. No obvious rash    LABS:      CBC Full  -  ( 21 Sep 2018 08:11 )  WBC Count : 7.9 K/uL  Hemoglobin : 9.2 g/dL  Hematocrit : 27.6 %  Platelet Count - Automated : 194 K/uL  Mean Cell Volume : 88.2 fl  Mean Cell Hemoglobin : 29.4 pg  Mean Cell Hemoglobin Concentration : 33.3 g/dL  Auto Neutrophil # : x  Auto Lymphocyte # : x  Auto Monocyte # : x  Auto Eosinophil # : x  Auto Basophil # : x  Auto Neutrophil % : x  Auto Lymphocyte % : x  Auto Monocyte % : x  Auto Eosinophil % : x  Auto Basophil % : x        143  |  112<H>  |  29.0<H>  ----------------------------<  107  3.7   |  21.0<L>  |  0.84    Ca    7.9<L>      20 Sep 2018 06:37  Mg     1.9         TPro  5.3<L>  /  Alb  3.1<L>  /  TBili  0.5  /  DBili  x   /  AST  9   /  ALT  11  /  AlkPhos  30<L>      PT/INR - ( 19 Sep 2018 13:12 )   PT: 11.9 sec;   INR: 1.08 ratio         PTT - ( 19 Sep 2018 13:12 )  PTT:25.0 sec      Urinalysis Basic - ( 19 Sep 2018 20:26 )    Color: Yellow / Appearance: Clear / S.020 / pH: x  Gluc: x / Ketone: Negative  / Bili: Negative / Urobili: Negative mg/dL   Blood: x / Protein: 15 mg/dL / Nitrite: Negative   Leuk Esterase: Negative / RBC: 0-2 /HPF / WBC 0-2   Sq Epi: x / Non Sq Epi: Negative / Bacteria: Negative

## 2018-09-21 NOTE — DISCHARGE NOTE ADULT - MEDICATION SUMMARY - MEDICATIONS TO STOP TAKING
I will STOP taking the medications listed below when I get home from the hospital:    meloxicam 15 mg oral tablet  -- 1 tab(s) by mouth once a day    aspirin 81 mg oral tablet, chewable  -- 1 tab(s) by mouth once a day

## 2018-09-21 NOTE — DISCHARGE NOTE ADULT - CARE PROVIDER_API CALL
Filiberto Kraus), Gastroenterology; Internal Medicine  [address] [place] [address]  Phone: [phone]  Fax: [phone] Filiberto Kraus), Gastroenterology; Internal Medicine  [address] [place] [address]  Phone: [phone]  Fax: [phone]    PCP,   Phone: (   )    -  Fax: (   )    -

## 2018-09-21 NOTE — DISCHARGE NOTE ADULT - CARE PLAN
Principal Discharge DX:	GI bleed due to NSAIDs  Goal:	resolved  Assessment and plan of treatment:	Pt is to continue pantoprazole twice a day for one month and then once a day in the morning there after. Pt is to follow up with Dr. Oretga (GI physician) as outpatient within 1 week as he will need review of the biopsies for H Pylori.  Pt is to refrain from NSAID/ASA use as of now. Follow up with outpatient GI and outpatient PMD for further mgt/tx  Secondary Diagnosis:	Duodenal ulcer disease  Assessment and plan of treatment:	see plan above  stable, no active bleeding  Secondary Diagnosis:	Hyperlipidemia  Assessment and plan of treatment:	resume home meds  Secondary Diagnosis:	Hypertension  Assessment and plan of treatment:	resume home meds Principal Discharge DX:	GI bleed due to NSAIDs  Goal:	resolved  Assessment and plan of treatment:	Pt is to continue pantoprazole twice a day for one month and then once a day in the morning there after. Pt is to follow up with Dr. Ortega (GI physician) as outpatient within 1 week as he will need review of the biopsies for H Pylori.  Pt is to refrain from NSAID/ASA use as of now. Follow up with outpatient GI and outpatient PMD for further mgt/tx  Secondary Diagnosis:	Duodenal ulcer disease  Assessment and plan of treatment:	see plan above  stable, no active bleeding  Secondary Diagnosis:	Hyperlipidemia  Assessment and plan of treatment:	resume home meds  Secondary Diagnosis:	Chronic bilateral low back pain, with sciatica presence unspecified  Assessment and plan of treatment:	Avoid NSAIDs /Motrin/ Advil, take tylenol as needed.

## 2018-09-21 NOTE — DISCHARGE NOTE ADULT - PLAN OF CARE
resolved Pt is to continue pantoprazole twice a day for one month and then once a day in the morning there after. Pt is to follow up with Dr. Ortega (GI physician) as outpatient within 1 week as he will need review of the biopsies for H Pylori.  Pt is to refrain from NSAID/ASA use as of now. Follow up with outpatient GI and outpatient PMD for further mgt/tx see plan above  stable, no active bleeding resume home meds Avoid NSAIDs /Motrin/ Advil, take tylenol as needed.

## 2018-09-21 NOTE — DISCHARGE NOTE ADULT - SECONDARY DIAGNOSIS.
Duodenal ulcer disease Hyperlipidemia Hypertension Chronic bilateral low back pain, with sciatica presence unspecified

## 2018-09-21 NOTE — DISCHARGE NOTE ADULT - CARE PROVIDERS DIRECT ADDRESSES
[email or web address]

## 2018-09-21 NOTE — PROGRESS NOTE ADULT - ATTENDING COMMENTS
I have seen and examined the patient with PA and agree with the above assessment and plan. Pt here with blood loss anemia, s/p transfusion, currently asymptomatic. F/u am labs if hgb stable can be discharged with outpatient f/u with PMD /GI. Discussed with Dr Howard- anemia likely from ulcer with slow bleeding- recommended o/p follow up.

## 2018-09-21 NOTE — PROGRESS NOTE ADULT - SUBJECTIVE AND OBJECTIVE BOX
KEYANA PALMA Male 73y [id number]    Patient is a 73y old  Male who presents with a chief complaint of SOB/ Anemia / GI bleed (19 Sep 2018 17:21)    Subjective/objective: Pt seen and examined at bedside, no over night event reported by night staff. Pt states doing well, no medical complaints, wants to go home. Last bowel movement 1 day ago, reports normal and no blood/dark tarry stools. Pt denies n/v fever, chills, headache, dizziness, SOB, chest pain, abdominal pain, melena.  Remainder of ROS negative. VSS. am labs pending     Vital Signs Last 24 Hrs  T(C): 36.5 (21 Sep 2018 04:38), Max: 37.2 (20 Sep 2018 11:00)  T(F): 97.7 (21 Sep 2018 04:38), Max: 98.9 (20 Sep 2018 11:00)  HR: 75 (21 Sep 2018 04:38) (75 - 87)  BP: 122/76 (21 Sep 2018 04:38) (118/68 - 151/87)  BP(mean): --  RR: 18 (20 Sep 2018 23:00) (18 - 20)  SpO2: 95% (20 Sep 2018 23:00) (95% - 98%)    PHYSICAL EXAM:  GENERAL: Pt lying comfortably, NAD.  CHEST/LUNG: Clear to auscultation bilaterally; No wheezing.  HEART: S1S2+, Regular rate and rhythm; No murmurs.  ABDOMEN: Soft, Nontender, Nondistended; Bowel sounds present.  Extremities: No LE edema, pulses +  NEURO: AAOX3, no focal deficits, no motor r sensory loss.  PSYCH: normal mood.    MEDICATIONS  (STANDING):  pantoprazole    Tablet 40 milliGRAM(s) Oral two times a day  potassium chloride    Tablet ER 40 milliEquivalent(s) Oral once    MEDICATIONS  (PRN):  acetaminophen   Tablet .. 650 milliGRAM(s) Oral every 6 hours PRN Temp greater or equal to 38C (100.4F), Mild Pain (1 - 3)  hydrALAZINE Injectable 10 milliGRAM(s) IV Push every 6 hours PRN SBP>170        Labs:  LABS:                        7.6    8.9   )-----------( 164      ( 20 Sep 2018 06:36 )             23.3     09-20    143  |  112<H>  |  29.0<H>  ----------------------------<  107  3.7   |  21.0<L>  |  0.84    Ca    7.9<L>      20 Sep 2018 06:37  Mg     1.9         TPro  5.3<L>  /  Alb  3.1<L>  /  TBili  0.5  /  DBili  x   /  AST  9   /  ALT  11  /  AlkPhos  30<L>      PT/INR - ( 19 Sep 2018 13:12 )   PT: 11.9 sec;   INR: 1.08 ratio         PTT - ( 19 Sep 2018 13:12 )  PTT:25.0 sec    LIVER FUNCTIONS - ( 20 Sep 2018 06:37 )  Alb: 3.1 g/dL / Pro: 5.3 g/dL / ALK PHOS: 30 U/L / ALT: 11 U/L / AST: 9 U/L / GGT: x           Urinalysis Basic - ( 19 Sep 2018 20:26 )    Color: Yellow / Appearance: Clear / S.020 / pH: x  Gluc: x / Ketone: Negative  / Bili: Negative / Urobili: Negative mg/dL   Blood: x / Protein: 15 mg/dL / Nitrite: Negative   Leuk Esterase: Negative / RBC: 0-2 /HPF / WBC 0-2   Sq Epi: x / Non Sq Epi: Negative / Bacteria: Negative        CARDIAC MARKERS ( 19 Sep 2018 13:12 )  x     / <0.01 ng/mL / x     / x     / x

## 2018-09-21 NOTE — PROGRESS NOTE ADULT - ASSESSMENT
Pt is 72 y/o male active smoker, HLD, bipolar disorder, chronic back pain, arthritis, gout, taking Advil for 3 months daily, presented to ED with black stool for 1 week and SOB/ dizziness for 2 days. In ED noted to have positive Occult blood and hemoglobin 6.8. Admitted for blood loss anemia possible UGI bleeding likely from NSAIDs. Gi evaluated pt, S/p EGD today which showed duodenal bulb ulcer but no active bleeding. S/p pRBCs 2 units transfusion with no appropriate response. Pending am labs for today.           1. Acute blood loss anemia / R/o GI bleed:  - Hx of chronic NSAIDs use secondary to severe back pain/anemia  - reports resolution of melena as of now  - s/p EGD which showed duodenal bulb ulcer but no active bleeding  - anemia work up negative, MCV WNL  - will resume regular diet today  - follow up GI plan  - c/w PPI BID for 3 months at least as per GI with outpatient follow up  - awaiting am labs     2. H/o HLD/  HTN / Bipolar disorder/ Gout:  - home meds obtained   - Atorvastatin 10mg QD, risperidone 0.5mg PO TID, meloxican 15mg QD, metamucil, ASA 81mg QD, Krill oil 500mg QD  - will not resume NSAIDS or ASA due to bleed      3. Chronic severe back pain / Arthritis  - Avoid NSAIDS   - Keep on Tylenol as needed for pain    4. DVT ppx- SCD, avoid chemical AC Pt is 74 y/o male active smoker, HLD, bipolar disorder, chronic back pain, arthritis, gout, taking Advil for 3 months daily, presented to ED with black stool for 1 week and SOB/ dizziness for 2 days. In ED noted to have positive Occult blood and hemoglobin 6.8. Admitted for blood loss anemia possible UGI bleeding likely from NSAIDs. Gi evaluated pt, S/p EGD today which showed duodenal bulb ulcer but no active bleeding. S/p pRBCs 2 units transfusion with no appropriate response. Pending am labs for today.           1. Acute blood loss anemia / R/o GI bleed:  - Hx of chronic NSAIDs use secondary to severe back pain/anemia  - reports resolution of melena as of now  - s/p EGD which showed duodenal bulb ulcer but no active bleeding  - anemia work up negative, MCV WNL  - will resume regular diet today  - follow up GI plan  - c/w PPI BID for 3 months at least as per GI with outpatient follow up  - awaiting am labs     2. H/o HLD/  HTN / Bipolar disorder/ Gout:  - home meds obtained   - Atorvastatin 10mg QD, risperidone 0.5mg PO TID, meloxican 15mg QD, metamucil, ASA 81mg QD, Krill oil 500mg QD  - will not resume NSAIDS or ASA due to bleed      3. Chronic severe back pain / Arthritis  - Avoid NSAIDS   - Keep on Tylenol as needed for pain    4. DVT ppx- SCD, avoid chemical AC    Dispo: awaiting am labs and GI follow up, possibly DC home today? Pt is 74 y/o male active smoker, HLD, bipolar disorder, chronic back pain, arthritis, gout, taking Advil for 3 months daily, presented to ED with black stool for 1 week and SOB/ dizziness for 2 days. In ED noted to have positive Occult blood and hemoglobin 6.8. Admitted for blood loss anemia possible UGI bleeding likely from NSAIDs. Gi evaluated pt, S/p EGD today which showed duodenal bulb ulcer but no active bleeding. S/p pRBCs  units transfusion with no appropriate response, 1 more unit transfused. Pt improved clinically, no more SOB /Dizziness or melena.          1. Acute blood loss anemia likely from Duodenal ulcer:  - Hx of chronic NSAIDs use secondary to severe back pain.  - reports resolution of melena as of now  - s/p EGD which showed duodenal bulb ulcer but no active bleeding  - anemia work up negative, MCV WNL  - Tolerating diet.   - Follow up GI plan- c/w PPI BID for 3 months at least as per GI with outpatient follow up  - awaiting am labs     2. H/o HLD/  HTN / Bipolar disorder/ Gout:  - home meds obtained and resumed. (Atorvastatin 10mg QD, risperidone 0.5mg PO TID, Meloxicam 15mg QD, metamucil, ASA 81mg QD, Krill oil 500mg QD_  - will not resume NSAIDS or ASA due to bleed      3. Chronic severe back pain / Arthritis  - Avoid NSAIDS   - Keep on Tylenol as needed for pain    4. DVT ppx- SCD, avoid chemical AC    Dispo: awaiting am labs and GI follow up, possibly DC home today?

## 2018-09-24 LAB — SURGICAL PATHOLOGY FINAL REPORT - CH: SIGNIFICANT CHANGE UP

## 2019-03-04 ENCOUNTER — OUTPATIENT (OUTPATIENT)
Dept: OUTPATIENT SERVICES | Facility: HOSPITAL | Age: 74
LOS: 1 days | End: 2019-03-04
Payer: MEDICARE

## 2019-03-04 ENCOUNTER — APPOINTMENT (OUTPATIENT)
Dept: RADIOLOGY | Facility: CLINIC | Age: 74
End: 2019-03-04

## 2019-03-04 DIAGNOSIS — Z00.8 ENCOUNTER FOR OTHER GENERAL EXAMINATION: ICD-10-CM

## 2019-03-04 PROBLEM — M10.9 GOUT, UNSPECIFIED: Chronic | Status: ACTIVE | Noted: 2018-09-19

## 2019-03-04 PROBLEM — E78.5 HYPERLIPIDEMIA, UNSPECIFIED: Chronic | Status: ACTIVE | Noted: 2018-09-19

## 2019-03-04 PROBLEM — I10 ESSENTIAL (PRIMARY) HYPERTENSION: Chronic | Status: ACTIVE | Noted: 2018-09-19

## 2019-03-04 PROCEDURE — 71046 X-RAY EXAM CHEST 2 VIEWS: CPT

## 2019-03-04 PROCEDURE — 71046 X-RAY EXAM CHEST 2 VIEWS: CPT | Mod: 26

## 2019-06-12 NOTE — PROGRESS NOTE ADULT - PROBLEM SELECTOR PROBLEM 1
Physician Discharge Summary     Patient ID:  Diego Chavez  [id number]  70 year old  1948    Admit date: 6/11/2019    Discharge date and time: 6/12/19    Admitting Physician: Ariel Terrell MD     Discharge Physician: Corie COTTER on behalf of Ariel Terrell M.D.    Admission Diagnoses: non-ischemic cardiomyopathy with reduced ejection fraction despite being on optimal guideline directed medical therapy (unable to tolerate beta blocker given bradycardia), NYHA Class II-III, left bundle branch block with QRS of 130 ms    Discharge Diagnoses:   · Persistent atrial fibrillation, duration unknown (but at least from 12/3/18 to 12/21/18)  ? Symptoms difficult to assess given co morbidities and as patient is a poor historian  ? Unknown when diagnosed, of review of tracings, seen during 07/2018 admission, rate control pursued.    ? Initially seen by EP 1/21/18, as unable to anticoagulate due to history of falls, subdural hematoma and hematuria, rate control continued  · CHADSVASc 4 (Age, CAD, CHF, HTN )  · Not on anticoagulation  ? Previously on Xarelto, discontinued during 12/2018 admission due to falls with subsequent subdural hematoma and hematuria  · Ischemic cardiomyopathy with reduced EF, LBBB s/p St. Scotty CRT-D 6/11/19  ? On Lisinopril (Since at least 2016).  Previously on Coreg discontinued 12/2018 for unclear reasons, previously on Lopressor, discontinued due to bradycardia, now on toprol 25 mg daily.  ? F/B Harriet Kenneth APNP  ? Normal device function  ? AP 93%, BP 95%, no AT/AF  ? Stable left incision site  · NYHA Class II-III  · Coronary artery disease S/P remote HX of CABG 2012  ? HX of NSTEMI 06/2012  · Other Medical History  ? HX of PE  ? HX of falls with subsequent subarachnoid hematoma and intracranial hemorrhage (admission 12/21/18- 12/30/18 & 1/6/19-1/9/19, ED visit 1/11/19)   ? Anemia  ? Neuroleptic malignant syndrome  ? Schizophrenia   ? PTSD  ? Bipolar  disorder  · EKG/Cardiac Monitoring  ? ECG 6/3/19: NSR rate 72, first degree AVB ( ms),  ms, QTc 492 ms  ? EKG 4/15/19: NSR,  ms  ? EKG 1/21/19: Accelerated junctional rhythm with occasional premature ventricular complexes, incomplete LBBB  ? EKG 1/11/19: Wide QRS with occasional PVCs  ? EKG 1/6/19: AF, LBBB  ? EKG 12/21/18: SR w/ premature supraventricular complexes, incomplete LBBB  ? EKG12/3/18: AF with SVR, incomplete LBBB  ? EKG 7/8/18: AF with premature ventricular or aberrantly conducted complexes   · Cardiac Imaging  ? Stress test 3/21/19: 63% max predicted heart rate, Large fixed septal, anteroseptal and inferior wall defects at the basal and middle thirds. No reversible/stress-induced perfusion abnormality- medical management per Cardiology, EF 31% with marked left ventricular dilatation.  ? Echo 12/12/18: EF 25%,IVS 0.9, LVPW 0.9, JONO 56.5 ml/m², RVSP 47.4 mmHg  ? Echo 7/9/18: EF 15%  ? Echo 10/2017: EF 30-35%, biatrial enlargement   · Labs  ? 6/11/19: K 3.6, creatinine 0.78, magnesium 1.7  ? TSH 2.231 (12/3/18)    Admission Condition: good    Discharged Condition: good    Indication for Admission: ICMP, LBBB, NYHA II-III, admitted for implant of CRT D    Hospital Course:   Successful implant of St. Scotty biventricular ICD without known complications. Normal device function and stable left incision site on POD #1. Did spend a longer time in PACU, due to apneic episodes once asleep. Per anesthesia note: 'transfer to floor on bipap/cpap overnight. Maintains SpO2 90s while awake but apnea spells when sleeping'. Initially placed on BiPAP. At 2200, CPAP applied at setting of 10, with FiO2 of 30%. Due to history of atrial fibrillation, stroke, and coronary artery disease with evidence of apnea during sleep, consult placed to sleep medicine. As he resides in the nursing home, order placed for continuous oximetry at night with use of oxygen per nasal cannula for O2 sats less than or equal to  88%. Due to nursing home and Medicare status, unable to order CPap directly due to reimbursement issues (per discharge planner). Other option was for patient to stay an additional night and perform oxygenation study which is not ideal as he has a history of worsening confusion and agitation with longer hospital stays.  Will send note to triage to follow-up on need for sleep study/C Pap. Discussed left arm restrictions and wound care with the patient. Given handout for passive range of motion exercises for left arm, as well as the usual discharge instructions post-device implant.    Discharge Exam:  S-denies discomfort at incision site. Alert and oriented, answering questions appropriately. Reports sleeping well. Tells me he is \"always fatigued\".  Discharge rhythm-AV paced rhythm  Chest x-ray-no pneumothorax, appropriate lead placement  St. Scotty device interrogation- normal device function.   Visit Vitals  /73 (BP Location: RUE, Patient Position: Semi-Mcnulty's)   Pulse 61   Temp 97.9 °F (36.6 °C) (Axillary)   Resp 19   Ht 6' 5\" (1.956 m)   Wt 82.9 kg   SpO2 97%   BMI 21.67 kg/m²     GENERAL:  Cooperative, sitting comfortably, in no acute distress.  HEENT:  No external ear or nasal discharge.  RESPIRATORY:  Muscles symmetrical, no use of accessory muscles with breathing noted.  Breath sounds clear.  CARDIOVASCULAR:  Auscultation: Regular rate and rhythm, normal S1/S2, 1/6 murmur.  No edema bilateral lower extremities.  NEURO: Alert and oriented to person, place, and time. No obvious deficits.  PSYCHIATRIC:  No anxiety, affect and mood appropriate.   INTEGUMENTARY:  Warm and dry to touch. Left pectoral incision well approximated without erythema, induration, or hematoma.  Steri-strips intact. Surrounding tissue soft to palpation. Mass noted on medial aspect above the right ankle area.     Disposition: Home    Patient Instructions:   Activity:   No lifting of left arm above shoulder level for 6 weeks. No lifting  GI bleed due to NSAIDs greater than 10 pounds with left arm for 6 weeks.  Do not wear arm sling, unless you sleep with affected arm above shoulder level.  Then only wear arm sling at night.  Do not drive a vehicle for one week.    May shower tomorrow, do not get incision wet for one week. Avoid direct water to incision for 6 weeks.  No tub baths, swimming, or hot tub until incision is completely healed, or for 6 weeks.  Leave steri strips in place (tape over incision).  Steri strips will be removed at clinic visit. Do not apply any lotion, powders, or ointments to incision site.    Monitor incision site for signs of bleeding, such as increased swelling or bruising.  Monitor incision for signs of infection, such as drainage, redness, or warmth at the site. Take  temperature daily or as needed for signs and symptoms of fever or chills.  Call EP office [phone] for any concerns.    May use Tylenol as directed and ice for 20 minutes at a time, for incisional discomfort.    Diet: resume prior diet  Wound Care: keep wound clean and dry    Follow-up with EP as scheduled    Addendum: Greater than 30 minutes was spent on discharge with greater than 50% spent in education and counseling regarding follow-up, medications, postop restrictions, contact with  and sleep medicine department.      During this visit I, Ariel Terrell, reviewed the overnight events, vitals,completed a limited physical exam, telemetry, ECG, labs, and medications. I developed the impression and plan. I confirmed the patient's  past family and social history that was originally obtained by the nurse/scribe. All of this has been recorded here as a scribed note by the nurse/scribe who performed the duties of a scribe for this encounter in my presence.

## 2019-09-25 NOTE — ED ADULT NURSE NOTE - GENITOURINARY WDL
spoke to [facility] for patient transfer.  Bladder non-tender and non-distended. Urine clear yellow.

## 2020-11-25 NOTE — H&P ADULT - ASSESSMENT
Pt is 74 y/o male active smoker, HLD, bipolar disorder, chronic back pain, arthritis, gout, taking Advil for 3 months daily, presented to ED with black stool for 1 week and SOB/ dizziness for 2 days.  In ED noted to have positive Occult blood and hemoglobin 6.8. Admitted for UGI bleeding likely from Advil use.    >Acute blood loss anemia likely from UGI bleeding:  - Likely from NSAIDs use.  - Keep NPO, IVFs, Type and screen, pRBC transfusion already ordered by ER.   - On Protonix infusion in ER- will c/w it.  - GI consulted from ER.     >H/o HLD/ ? HTN / Bipolar disorder/ Gout:  - Pt does not remember home meds, wife to bring med list, will resume once verified.     >Chronic severe back pain / Arthritis  - Avoid NSAIDS, Keep on Tylenol    >DVT ppx- SCD, avoid chemical AC. ROS  CONSTITUTIONAL: No fevers, no chills, no decrease activity, no irritability.  Head: no headache  EYES/ENT: No eye discharge, no throat pain, no nasal congestion, no rhinorrhea, no otalgia, no ear tugging.   NECK: No pain  RESPIRATORY: No cough, no wheezing, no increase work of breathing, no shortness of breath.  CARDIOVASCULAR: No chest pain, no palpitations.  GASTROINTESTINAL: No abdominal pain. No nausea, no vomiting. No diarrhea, no constipation. No decrease appetite. No hematemesis. No melena or hematochezia.  GENITOURINARY: No dysuria, frequency or hematuria.  NEUROLOGICAL: No numbness, no weakness.  SKIN: No itching, no rash.

## 2022-05-02 ENCOUNTER — APPOINTMENT (OUTPATIENT)
Dept: ORTHOPEDIC SURGERY | Facility: CLINIC | Age: 77
End: 2022-05-02
Payer: MEDICARE

## 2022-05-02 VITALS
SYSTOLIC BLOOD PRESSURE: 141 MMHG | TEMPERATURE: 98.1 F | WEIGHT: 210 LBS | HEART RATE: 67 BPM | DIASTOLIC BLOOD PRESSURE: 84 MMHG | HEIGHT: 68 IN | BODY MASS INDEX: 31.83 KG/M2

## 2022-05-02 DIAGNOSIS — Z86.59 PERSONAL HISTORY OF OTHER MENTAL AND BEHAVIORAL DISORDERS: ICD-10-CM

## 2022-05-02 DIAGNOSIS — M16.0 BILATERAL PRIMARY OSTEOARTHRITIS OF HIP: ICD-10-CM

## 2022-05-02 DIAGNOSIS — M25.551 PAIN IN RIGHT HIP: ICD-10-CM

## 2022-05-02 DIAGNOSIS — Z79.891 LONG TERM (CURRENT) USE OF OPIATE ANALGESIC: ICD-10-CM

## 2022-05-02 DIAGNOSIS — M25.552 PAIN IN RIGHT HIP: ICD-10-CM

## 2022-05-02 DIAGNOSIS — Z82.61 FAMILY HISTORY OF ARTHRITIS: ICD-10-CM

## 2022-05-02 DIAGNOSIS — Z87.891 PERSONAL HISTORY OF NICOTINE DEPENDENCE: ICD-10-CM

## 2022-05-02 PROCEDURE — 99204 OFFICE O/P NEW MOD 45 MIN: CPT

## 2022-05-02 PROCEDURE — 73523 X-RAY EXAM HIPS BI 5/> VIEWS: CPT

## 2022-05-02 RX ORDER — ZOLPIDEM TARTRATE 10 MG/1
10 TABLET ORAL
Qty: 30 | Refills: 0 | Status: ACTIVE | COMMUNITY
Start: 2022-03-25

## 2022-05-02 RX ORDER — RISPERIDONE 0.5 MG/1
0.5 TABLET, FILM COATED ORAL
Qty: 270 | Refills: 0 | Status: ACTIVE | COMMUNITY
Start: 2021-12-15

## 2022-05-02 RX ORDER — DIPHENHYDRAMINE HCL 50 MG/1
50 CAPSULE ORAL
Qty: 60 | Refills: 0 | Status: ACTIVE | COMMUNITY
Start: 2021-11-17

## 2022-05-02 RX ORDER — TRAZODONE HYDROCHLORIDE 150 MG/1
150 TABLET ORAL
Qty: 30 | Refills: 0 | Status: ACTIVE | COMMUNITY
Start: 2022-04-22

## 2022-05-02 RX ORDER — HYDROCODONE BITARTRATE AND ACETAMINOPHEN 10; 325 MG/1; MG/1
10-325 TABLET ORAL
Qty: 60 | Refills: 0 | Status: ACTIVE | COMMUNITY
Start: 2022-04-22

## 2022-05-02 RX ORDER — ATORVASTATIN CALCIUM 10 MG/1
10 TABLET, FILM COATED ORAL
Qty: 90 | Refills: 0 | Status: ACTIVE | COMMUNITY
Start: 2021-12-15

## 2022-05-02 NOTE — DISCUSSION/SUMMARY
[Medication Risks Reviewed] : Medication risks reviewed [Surgical risks reviewed] : Surgical risks reviewed [de-identified] : 76 y/o M with bone on bone right hip osteoarthritis and severe left hip osteoarthritis. We discussed the nature of the condition. Based on imaging, he is a candidate for a staged bilateral ALYCE. He is having severe pain which is limiting his ADLs. He is losing his quality of life and is interested in pursuing the right ALYCE in October 2022. We discussed pre-op, surgery, and post-op in detail. He is currently taking hydrocodone BID. We reviewed the risks associated with chronic opioid use and worse outcomes following surgery. He will reach out to our surgical coordinator in the near future to start the scheduling process. All questions were answered. \par \par The patient is a 77 year individual with end stage arthritis of their b/l hip joint. Based upon the patient's continued symptoms and failure to respond to conservative treatment I have recommended a right total hip arthroplasty for this patient. A long discussion took place with the patient describing what a total joint replacement is and what the procedure would entail. A total hip arthroplasty model, similar to the implant that will be used during the operation, was utilized to demonstrate and to discuss the various bearing surfaces of the implants. The hospitalization and post-operative care and rehabilitation were also discussed. The use of perioperative antibiotics and DVT prophylaxis were discussed. The risk, benefits and alternatives to a surgical intervention were discussed at length with the patient. The patient was also advised of risks related to the medical comorbidities, elevated body mass index (BMI), and smoking where applicable. We discussed how to reduce modifiable risk factors and encouraged smoking cessation were applicable.. A lengthy discussion took place to review the most common complications including but not limited to: deep vein thrombosis, pulmonary embolus, heart attack, stroke, infection, wound breakdown, numbness, damage to nerves, tendon, muscles, arteries or other blood vessels, death and other possible complications from anesthesia. The patient was told that we will take steps to minimize these risks by using sterile technique, antibiotics and DVT prophylaxis when appropriate and follow the patient postoperatively in the office setting to monitor progress. The possibility of recurrent pain, no improvement in pain and actual worsening of pain were also discussed with the patient.\par The discharge plan of care focused on the patient going home following surgery. The patient was encouraged to make the necessary arrangements to have someone stay with them when they are discharged home. Following discharge, a home care nurse will visit the patient. The home care nurse will open your home care case and request home physical therapy services. Home physical therapy will commence following discharge provided it is appropriate and covered by the health insurance benefit plan. \par The benefits of surgery were discussed with the patient including the potential for improving his current clinical condition through operative intervention. Alternatives to surgical intervention including continued conservative management were also discussed in detail. All questions were answered to the satisfaction of the patient. The treatment plan of care, as well as a model of a total hip arthroplasty equivalent to the one that will be used for their total joint replacement, was shared with the patient. The patient agreed to the plan of care as well as the use of implants in their total joint replacement.

## 2022-05-02 NOTE — END OF VISIT
[FreeTextEntry3] : I, Carlitos Gonzalez, acted solely as a scribe for Dr. Alphonse Chacon on this date 05/02/2022.

## 2022-05-02 NOTE — REVIEW OF SYSTEMS
[Joint Pain] : joint pain [Joint Stiffness] : joint stiffness [Negative] : Heme/Lymph [FreeTextEntry9] : b/l hip

## 2022-05-02 NOTE — HISTORY OF PRESENT ILLNESS
[Pain Location] : pain [Worsening] : worsening [___ yrs] : [unfilled] year(s) ago [8] : a current pain level of 8/10 [9] : a maximum pain level of 9/10 [3] : a minimum pain level of 3/10 [Hip Movement] : worsened by hip movement [Walking] : worsened by walking [Opioid Analgesics] : relieved by opioid analgesics [Rest] : relieved by rest [de-identified] : 78 y/o M presents with b/l knee pain. He has more pain in the left hip than in the right hip. The pain has been present for a few years. He was seen by pain management about 1-2 years ago for low back pain and had xrays of the hip. He tried spine injection which did not help and he was told that he needs a hip replacement. He is on hydrocodone 10.325mg 2 times per day. He is on risperidone and trazodone for bipolar disorder. \par \par

## 2022-05-02 NOTE — PHYSICAL EXAM
[LE] : Sensory: Intact in bilateral lower extremities [ALL] : dorsalis pedis, posterior tibial, femoral, popliteal, and radial 2+ and symmetric bilaterally [Normal] : Alert and in no acute distress [Poor Appearance] : well-appearing [de-identified] : GENERAL APPEARANCE: Well nourished and hydrated, pleasant, alert, and oriented x 3. Appears their stated age. \par HEENT: Normocephalic, extraocular eye motion intact. Nasal septum midline. Oral cavity clear. External auditory canal clear. \par RESPIRATORY: Breath sounds clear and audible in all lobes. No wheezing, No accessory muscle use.\par CARDIOVASCULAR: No apparent abnormalities. No lower leg edema. No varicosities. Pedal pulses are palpable.\par NEUROLOGIC: Sensation is normal, no muscle weakness in the upper or lower extremities.\par DERMATOLOGIC: No apparent skin lesions, moist, warm, no rash.\par SPINE: Cervical spine appears normal and moves freely; thoracic spine appears normal and moves freely; lumbosacral spine appears normal and moves freely, normal, nontender.\par MUSCULOSKELETAL: Hands, wrists, and elbows are normal and move freely, shoulders are normal and move freely.  [de-identified] : Bilateral hip exam shows groin/thigh pain with SLR, no internal rotation\par Left hip exam shows groin/thigh pain with Stinchfield maneuver [de-identified] : 5V xray of the b/l hip done in the office today and reviewed by Dr. Alphonse Chacon demonstrates bone on bone right hip osteoarthritis and severe left hip osteoarthritis

## 2022-05-25 NOTE — DISCHARGE NOTE ADULT - NURSING SECTION COMPLETE
[facility] Ambulatory Established Patient Note    Chief Complaint   Patient presents with   • Office Visit   • Hematuria     microhematuria         Subjective:  Marybel Garcia is a 71 year old female seen today with a chief complaint of microhematuria.  Per chart review of old records, lab values/study results, and patient-given history:    1)  Hematuria  - Patient first was found to have hematuria, severity minimal micro, on 5/12/22.  No gross hematuria.  No associated lower urinary tract symptoms (LUTS) as per IPSS below.  Nohistory of stones.   No history of recent or recurrent UTIs.  No history of  surgery.  No family history of  malignancy.  Nonsmoker.  CTU pending.  She presents for cysto.        BPH SX's IN PAST MONTH 5/17/2022   Incomplete Emptying (1-5) 0   Frequency (1-5) 0   Intermittency (1-5) 0   Urgency (1-5) 0   Stream (1-5) 0   Straining (1-5) 0   Nocturia (1-5) 2   Total Score 2   Quality of Life 0=DELIGHTED       Past Medical History:   Diagnosis Date   • Cataract    • Hereditary hemochromatosis (CMS/HCC)     HFE related--varient detected--done through some saliva genetic test she did online   • HTN (hypertension)    • Microscopic hematuria 05/17/2022   • S/P colonoscopy 2009,2014, 1-2020    april q 5 yrs--tub ludivina polyps--nl in 2020--april 5 yrs 2025       Past Surgical History:   Procedure Laterality Date   • Chalazion excision     • Colonoscopy diagnostic  10/22/2014    Affi 5yr recall, polyp tubular adenoma fmhx of coca   • Colonoscopy diagnostic  01/10/2020    Affi, hx polyps, no polyps, 5 years   • Cystourethroscopy  05/25/2022   • Hb delivery vaginal      X 2--Nancy, and Chris   • Knee arthroscopy w/ meniscectomy      right   • Open access colonoscopy  2009, 2014, 1-2020    april q 5 yrs--tub ludivina polyps--nl in 2020--april 5 yrs 2025   • Tonsillectomy and adenoidectomy         ALLERGIES:   Allergen Reactions   • Amoxicillin Other (See Comments)   • Erythromycin NAUSEA   • Amoxicillin-Pot  Clavulanate Other (See Comments)     Yeast inf, hemorrhoids, pink eye--a myriad of sxs--she does not want again       Family History   Problem Relation Age of Onset   • Diabetes Father    • Hypertension Father    • Heart Father             • Neurological Disorder Father         Alzheimers   • Macular degeneration Father    • Cataracts Father    • Cancer Maternal Grandmother         uterine   • Stroke Paternal Grandfather    • Gastrointestinal Mother         colon polyps/lymphocytic colitis   • Cataracts Mother    • Cancer Mother         multiple myeloma   • Cancer Sister         thyroid CA   • Heart Daughter         Paroxysmal SVT/ablation   • Other Other         pernicious anemia,aunt and mat grfthr   • Blindness Brother        Social History     Tobacco Use   • Smoking status: Never Smoker   • Smokeless tobacco: Never Used   Substance Use Topics   • Alcohol use: Yes     Alcohol/week: 1.0 standard drink     Types: 1 Standard drinks or equivalent per week     Comment: rarely   • Drug use: No       Current Outpatient Medications   Medication Sig Dispense Refill   • benzoyl peroxide 5 % gel Follow surgery instructions in your surgery folder 60 g 0   • benazepril (LOTENSIN) 20 MG tablet Take 1 tablet by mouth daily. 90 tablet 3   • polyethylene glycol (GLYCOLAX, MIRALAX) packet Take by mouth daily. 1 tsp     • Misc Natural Products (GLUCOS-CHONDROIT-MSM COMPLEX) TABS Take  by mouth 2 times daily.     • Calcium Citrate-Vitamin D (CALCIUM CITRATE + PO) Take 1 tablet by mouth 2 times daily.       Current Facility-Administered Medications   Medication Dose Route Frequency Provider Last Rate Last Admin   • lidocaine 2% urethral (UROJET) 2 % jelly 5 mL  5 mL Transurethral Once Amul MD Ashley       • nitrofurantoin (macrocrystal-monohydrate) (MACROBID) SR capsule 100 mg  100 mg Oral Once Amul MD Ashley             Physical Exam:    Constitutional:  The patient is well nourished, in no acute distress, appears stated age.    Psychiatric:  Normal mood/affect.      Results Reviewed:    Recent Labs   Lab 05/12/22  1010 04/11/22  1021 11/11/21  1511   WBC 7.7 6.9 8.3   HGB 14.2 14.7 14.7   HCT 44.0 45.0 44.2     Recent Labs   Lab 05/12/22  1010 04/11/22  1021 11/11/21  1511   Sodium 141 139 141   Potassium 4.5 3.9 4.5   Chloride 105 104 105   Carbon Dioxide 33* 32 31   BUN 16 13 14   Creatinine 0.70 0.65 0.67   Glucose 94 105* 103*   Albumin 3.5* 3.6 3.9       U/A:    Component      Latest Ref Rng & Units 5/12/2022   COLOR       Yellow   CLARITY       Clear   GLUCOSE(URINE)      Negative mg/dL Negative   BILIRUBIN      Negative Negative   KETONES      Negative mg/dL Negative   SPECIFIC GRAVITY      1.005 - 1.030 1.015   BLOOD      Negative Trace (A)   pH      5.0 - 7.0 6.0   PROTEIN(URINE)      Negative mg/dL Negative   UROBILINOGEN      0.2, 1.0 mg/dL 0.2   NITRITE      Negative Negative   LEUKOCYTE ESTERASE      Negative Negative   Squamous EPI'S      None Seen, 1 to 5 /hpf 1 to 5   ERYTHROCYTES, URINALYSIS      None Seen, 1 to 2 /hpf 3 to 5 (A)   LEUKOCYTES, URINALYSIS      None Seen, 1 to 5 /hpf 1 to 5   BACTERIA, URINALYSIS      None Seen /hpf Few (A)   HYALINE CASTS, URINALYSIS      None Seen, 1 to 5 /lpf None Seen   MUCOUS       Present       Imaging Studies (personally reviewed):    No recent      Procedure:  Pre Procedure Diagnosis:    1)  Microhematuria    Post Procedure Diagnosis:    1)  Microhematuria    Procedure:  Flexible Cystoscopy    Complications:  None    Anesthesia - 2% Lidocaine Gel    Physician:  Amalia Ramirez MD    Procedure Note:  Time Out performed at 0950, with patient verifying procedure and consent prior to performing the procedure.    Patient was placed in the dorsal lithotomy position, and prepped and draped in the usual fashion for a cystoscopic procedure.  2% lidocaine gel was instilled into the urethra.      Endoscopic examination of the urethra revealed no abnormalities.  There was no evidence of urethral  stricture disease, masses or lesions.  The bladder neck was open without abnormality.  The bladder had no evidence of masses, lesions, inflammation or other mucosal abnormalities.  The trigone was in the appropriate position.  The ureteral orifices were located appropriately upon to trigone.  Clear efflux was visualized from both the right but not the left UO (likely just a matter of being off on timing).  Retroflexing the cystoscope revealed a normal bladder neck.    The patient tolerated the procedure well without complication.      Sign Out performed at 0955, with patient verifying procedure performed and addressing specimens if applicable upon completion of the procedure.    _________________________________________________________________________    I personally reviewed the following results during this encounter:  UA  CBC  BMP        Assessment/Plan:  Marybel Garcia is a 71 year old female with the following urologic issues:    1)  Microhematuria  Existing Problem, Stable, with Plan as Follows:  - Cysto negative  - Await CTU ordered by PCP        - Instructions provided as documented in the After Visit Summary.  - The patient indicated understanding of the diagnosis and agreed with the plan of care.           Thank you for allowing me to participate in the care of this patient.  Amalia Ramirez MD   Patient/Caregiver provided printed discharge information.

## 2022-07-01 ENCOUNTER — RESULT REVIEW (OUTPATIENT)
Age: 77
End: 2022-07-01

## 2022-07-01 ENCOUNTER — OUTPATIENT (OUTPATIENT)
Dept: OUTPATIENT SERVICES | Facility: HOSPITAL | Age: 77
LOS: 1 days | End: 2022-07-01
Payer: MEDICARE

## 2022-07-01 VITALS
WEIGHT: 205.03 LBS | TEMPERATURE: 98 F | HEIGHT: 68 IN | RESPIRATION RATE: 18 BRPM | OXYGEN SATURATION: 96 % | DIASTOLIC BLOOD PRESSURE: 86 MMHG | HEART RATE: 67 BPM | SYSTOLIC BLOOD PRESSURE: 152 MMHG

## 2022-07-01 DIAGNOSIS — Z01.818 ENCOUNTER FOR OTHER PREPROCEDURAL EXAMINATION: ICD-10-CM

## 2022-07-01 DIAGNOSIS — Z98.49 CATARACT EXTRACTION STATUS, UNSPECIFIED EYE: Chronic | ICD-10-CM

## 2022-07-01 DIAGNOSIS — J38.1 POLYP OF VOCAL CORD AND LARYNX: Chronic | ICD-10-CM

## 2022-07-01 DIAGNOSIS — M16.11 UNILATERAL PRIMARY OSTEOARTHRITIS, RIGHT HIP: ICD-10-CM

## 2022-07-01 DIAGNOSIS — Z29.9 ENCOUNTER FOR PROPHYLACTIC MEASURES, UNSPECIFIED: ICD-10-CM

## 2022-07-01 LAB
A1C WITH ESTIMATED AVERAGE GLUCOSE RESULT: 5.3 % — SIGNIFICANT CHANGE UP (ref 4–5.6)
ANION GAP SERPL CALC-SCNC: 13 MMOL/L — SIGNIFICANT CHANGE UP (ref 5–17)
APTT BLD: 32.6 SEC — SIGNIFICANT CHANGE UP (ref 27.5–35.5)
BASOPHILS # BLD AUTO: 0.04 K/UL — SIGNIFICANT CHANGE UP (ref 0–0.2)
BASOPHILS NFR BLD AUTO: 0.4 % — SIGNIFICANT CHANGE UP (ref 0–2)
BLD GP AB SCN SERPL QL: SIGNIFICANT CHANGE UP
BUN SERPL-MCNC: 18.7 MG/DL — SIGNIFICANT CHANGE UP (ref 8–20)
CALCIUM SERPL-MCNC: 9.4 MG/DL — SIGNIFICANT CHANGE UP (ref 8.6–10.2)
CHLORIDE SERPL-SCNC: 102 MMOL/L — SIGNIFICANT CHANGE UP (ref 98–107)
CO2 SERPL-SCNC: 24 MMOL/L — SIGNIFICANT CHANGE UP (ref 22–29)
CREAT SERPL-MCNC: 0.89 MG/DL — SIGNIFICANT CHANGE UP (ref 0.5–1.3)
EGFR: 88 ML/MIN/1.73M2 — SIGNIFICANT CHANGE UP
EOSINOPHIL # BLD AUTO: 0.15 K/UL — SIGNIFICANT CHANGE UP (ref 0–0.5)
EOSINOPHIL NFR BLD AUTO: 1.6 % — SIGNIFICANT CHANGE UP (ref 0–6)
ESTIMATED AVERAGE GLUCOSE: 105 MG/DL — SIGNIFICANT CHANGE UP (ref 68–114)
GLUCOSE SERPL-MCNC: 101 MG/DL — HIGH (ref 70–99)
HCT VFR BLD CALC: 48.9 % — SIGNIFICANT CHANGE UP (ref 39–50)
HGB BLD-MCNC: 16.2 G/DL — SIGNIFICANT CHANGE UP (ref 13–17)
IMM GRANULOCYTES NFR BLD AUTO: 0.4 % — SIGNIFICANT CHANGE UP (ref 0–1.5)
INR BLD: 1.03 RATIO — SIGNIFICANT CHANGE UP (ref 0.88–1.16)
LYMPHOCYTES # BLD AUTO: 2.59 K/UL — SIGNIFICANT CHANGE UP (ref 1–3.3)
LYMPHOCYTES # BLD AUTO: 27.3 % — SIGNIFICANT CHANGE UP (ref 13–44)
MCHC RBC-ENTMCNC: 30.2 PG — SIGNIFICANT CHANGE UP (ref 27–34)
MCHC RBC-ENTMCNC: 33.1 GM/DL — SIGNIFICANT CHANGE UP (ref 32–36)
MCV RBC AUTO: 91.1 FL — SIGNIFICANT CHANGE UP (ref 80–100)
MONOCYTES # BLD AUTO: 1.07 K/UL — HIGH (ref 0–0.9)
MONOCYTES NFR BLD AUTO: 11.3 % — SIGNIFICANT CHANGE UP (ref 2–14)
MRSA PCR RESULT.: SIGNIFICANT CHANGE UP
NEUTROPHILS # BLD AUTO: 5.58 K/UL — SIGNIFICANT CHANGE UP (ref 1.8–7.4)
NEUTROPHILS NFR BLD AUTO: 59 % — SIGNIFICANT CHANGE UP (ref 43–77)
PLATELET # BLD AUTO: 209 K/UL — SIGNIFICANT CHANGE UP (ref 150–400)
POTASSIUM SERPL-MCNC: 4.3 MMOL/L — SIGNIFICANT CHANGE UP (ref 3.5–5.3)
POTASSIUM SERPL-SCNC: 4.3 MMOL/L — SIGNIFICANT CHANGE UP (ref 3.5–5.3)
PROTHROM AB SERPL-ACNC: 11.9 SEC — SIGNIFICANT CHANGE UP (ref 10.5–13.4)
RBC # BLD: 5.37 M/UL — SIGNIFICANT CHANGE UP (ref 4.2–5.8)
RBC # FLD: 13.7 % — SIGNIFICANT CHANGE UP (ref 10.3–14.5)
S AUREUS DNA NOSE QL NAA+PROBE: SIGNIFICANT CHANGE UP
SODIUM SERPL-SCNC: 139 MMOL/L — SIGNIFICANT CHANGE UP (ref 135–145)
WBC # BLD: 9.47 K/UL — SIGNIFICANT CHANGE UP (ref 3.8–10.5)
WBC # FLD AUTO: 9.47 K/UL — SIGNIFICANT CHANGE UP (ref 3.8–10.5)

## 2022-07-01 PROCEDURE — 93010 ELECTROCARDIOGRAM REPORT: CPT

## 2022-07-01 PROCEDURE — 71046 X-RAY EXAM CHEST 2 VIEWS: CPT

## 2022-07-01 PROCEDURE — G0463: CPT

## 2022-07-01 PROCEDURE — 71046 X-RAY EXAM CHEST 2 VIEWS: CPT | Mod: 26

## 2022-07-01 PROCEDURE — 93005 ELECTROCARDIOGRAM TRACING: CPT

## 2022-07-01 RX ORDER — SODIUM CHLORIDE 9 MG/ML
3 INJECTION INTRAMUSCULAR; INTRAVENOUS; SUBCUTANEOUS EVERY 8 HOURS
Refills: 0 | Status: DISCONTINUED | OUTPATIENT
Start: 2022-07-14 | End: 2022-07-15

## 2022-07-01 NOTE — H&P PST ADULT - GASTROINTESTINAL
normal/soft/nontender/nondistended/normal active bowel sounds negative normal/soft/nontender/nondistended/normal active bowel sounds/no guarding/no rigidity/no organomegaly/no palpable zulema/no masses palpable/distended

## 2022-07-01 NOTE — H&P PST ADULT - NSICDXPASTMEDICALHX_GEN_ALL_CORE_FT
PAST MEDICAL HISTORY:  Current smoker     Gout     History of bipolar disorder     Hyperlipidemia     Hypertension     Unilateral primary osteoarthritis, right hip      PAST MEDICAL HISTORY:  At risk for sleep apnea     Current smoker     Gout     History of bipolar disorder     Hyperlipidemia     Hypertension     Obese BMI 31    Unilateral primary osteoarthritis, right hip

## 2022-07-01 NOTE — H&P PST ADULT - HISTORY OF PRESENT ILLNESS
M presents with b/l knee pain. He has more pain in the left hip than in the right hip. The pain has been present for a few years. He was seen by pain management about 1-2 years ago for low back pain and had xrays of the hip. He tried spine injection which did not help and he was told that he needs a hip replacement. He is on hydrocodone 10.325mg 2 times per day. He is on risperidone and trazodone for bipolar disorder.  Pt is a 77 years -old male seen today pre-op for Right hip total joint replacement. Pt medical hx includes b/l knee pain, HLD, current smoker, bipolar disorder, obese (BMI 31). Pt report longstanding b/l hip and lumbar region pain, right hip hip worse than his left hip. Pt denies any trauma to site. Pt sees management about 1-2 years ago for low back pain, Pt report prior he tried spine injection which did not help and he was told that he needs a hip replacement. Pt  is on hydrocodone daily and Prn Tylenol, requires the use of a walker to assist with stability. Seen today for a scheduled on 7/14/2022 with Dr. Chacon

## 2022-07-01 NOTE — H&P PST ADULT - MUSCULOSKELETAL
details… b/l hip limited ROM right worse/back exam/decreased strength b/l hip limited ROM right worse/decreased ROM/decreased ROM due to pain/back exam/decreased strength

## 2022-07-01 NOTE — H&P PST ADULT - ASSESSMENT
Pt is a 77 years -old male seen today pre-op for Right hip total joint replacement. Pt medical hx includes b/l knee pain, HLD, current smoker, bipolar disorder, obese (BMI 31). Pt report longstanding b/l hip and lumbar region pain, right hip hip worse than his left hip. Pt denies any trauma to site. Pt sees management about 1-2 years ago for low back pain, Pt report prior he tried spine injection which did not help and he was told that he needs a hip replacement. Pt  is on hydrocodone daily and Prn Tylenol, requires the use of a walker to assist with stability. Seen today for a scheduled on 2022 with Dr. Chacon. Surgery protocol reviewed with Pt today. Pt to follow-up with PCP for medical clearance, Pt and his wife instructed to scheduled for COVID test PCR TEST for 2022 prior to this  procedure    CAPRINI VTE 2.0 SCORE [CLOT updated 2019]    AGE RELATED RISK FACTORS                                                       MOBILITY RELATED FACTORS  [ ] Age 41-60 years                                            (1 Point)                    [ ] Bed rest                                                        (1 Point)  [ ] Age: 61-74 years                                           (2 Points)                  [ ] Plaster cast                                                   (2 Points)  [ x] Age= 75 years                                              (3 Points)                    [ ] Bed bound for more than 72 hours                 (2 Points)    DISEASE RELATED RISK FACTORS                                               GENDER SPECIFIC FACTORS  [ ] Edema in the lower extremities                       (1 Point)              [ ] Pregnancy                                                     (1 Point)  [ ] Varicose veins                                               (1 Point)                     [ ] Post-partum < 6 weeks                                   (1 Point)             [x ] BMI > 25 Kg/m2                                            (1 Point)                     [ ] Hormonal therapy  or oral contraception          (1 Point)                 [ ] Sepsis (in the previous month)                        (1 Point)               [ ] History of pregnancy complications                 (1 point)  [ ] Pneumonia or serious lung disease                                               [ ] Unexplained or recurrent                     (1 Point)           (in the previous month)                               (1 Point)  [ ] Abnormal pulmonary function test                     (1 Point)                 SURGERY RELATED RISK FACTORS  [ ] Acute myocardial infarction                              (1 Point)               [ ]  Section                                             (1 Point)  [ ] Congestive heart failure (in the previous month)  (1 Point)      [ ] Minor surgery                                                  (1 Point)   [ ] Inflammatory bowel disease                             (1 Point)               [ ] Arthroscopic surgery                                        (2 Points)  [ ] Central venous access                                      (2 Points)                [ ] General surgery lasting more than 45 minutes (2 points)  [ ] Malignancy- Present or previous                   (2 Points)                [ x] Elective arthroplasty                                         (5 points)    [ ] Stroke (in the previous month)                          (5 Points)                                                                                                                                                           HEMATOLOGY RELATED FACTORS                                                 TRAUMA RELATED RISK FACTORS  [ ] Prior episodes of VTE                                     (3 Points)                [ ] Fracture of the hip, pelvis, or leg                       (5 Points)  [ ] Positive family history for VTE                         (3 Points)             [ ] Acute spinal cord injury (in the previous month)  (5 Points)  [ ] Prothrombin 29667 A                                     (3 Points)               [ ] Paralysis  (less than 1 month)                             (5 Points)  [ ] Factor V Leiden                                             (3 Points)                  [ ] Multiple Trauma within 1 month                        (5 Points)  [ ] Lupus anticoagulants                                     (3 Points)                                                           [ ] Anticardiolipin antibodies                               (3 Points)                                                       [ ] High homocysteine in the blood                      (3 Points)                                             [ ] Other congenital or acquired thrombophilia      (3 Points)                                                [ ] Heparin induced thrombocytopenia                  (3 Points)                                     Total Score [   9       ]  OPIOID RISK TOOL    LISBETH EACH BOX THAT APPLIES AND ADD TOTALS AT THE END    FAMILY HISTORY OF SUBSTANCE ABUSE                 FEMALE         MALE                                                Alcohol                             [  ]1 pt          [  ]3pts                                               Illegal Durgs                     [  ]2 pts        [  ]3pts                                               Rx Drugs                           [  ]4 pts        [  ]4 pts    PERSONAL HISTORY OF SUBSTANCE ABUSE                                                                                          Alcohol                             [  ]3 pts       [  ]3 pts                                               Illegal Drugs                     [  ]4 pts        [  ]4 pts                                               Rx Drugs                           [  ]5 pts        [  ]5 pts    AGE BETWEEN 16-45 YEARS                                      [  ]1 pt         [  ]1 pt    HISTORY OF PREADOLESCENT   SEXUAL ABUSE                                                             [  ]3 pts        [  ]0pts    PSYCHOLOGICAL DISEASE                     ADD, OCD, Bipolar, Schizophrenia        [ x ]2 pts         [  ]2 pts                      Depression                                               [  ]1 pt           [  ]1 pt           SCORING TOTAL   (add numbers and type here)              (*2**)                                     A score of 3 or lower indicated LOW risk for future opioid abuse  A score of 4 to 7 indicated moderate risk for future opioid abuse  A score of 8 or higher indicates a high risk for opioid abuse

## 2022-07-01 NOTE — H&P PST ADULT - OPHTHALMOLOGIC
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), chronic pain s/p lumbar laminectomy, left soleal vein thrombosis (apixaban) lung adenocarcinoma (s/p RML resection 2018), recent RLE cellulitis s/p doxycycline, recent CDiff colitis, CAD admitted to OSH for symptomatic hyponatremia 2/2 psychogenic polydipsia complicated with new onset generalized seizures, intubated for hypoxic respiratory failure transferred to [facility] MICU for further management      #NEURO   //Status Epilepticus   -OSH s/p keppra and vimpat load  -start versed and wean off propfol   -Continue keppra 1000 mg BID   -c/w vimpat  -monitor for seizures on EEG  -Neurology recs appreciated     #CV  -wean levo as tolerated MAP >60  -hold home clonidine   -Possible MI hx    #RESP   //Hypoxic Respiratory failure 2/2 seizures  -Vent settings: 30/400/16/5   -ABG daily  -ETT replaced ON, confirmed w. CXR    //Hx of adenocarcinoma   -s/p RML wedge resection in 2018      #GI   //Chronic pancreatitis   -NPO   -Place NGT tube   -Start IV D5 LR   -f/u Amylase, lipase levels and TG  -C/w Creon 12,000 Lipase Units TID   -Protonix 40 mg BID   -C/w Folic Acid 1mg  and Multivitamin 1 tablet QD       #ID   //Rule out Meningitis   - New-onset seizures in setting of fever of 101.6 F, no WBC. CTH neg  - Plan for LP AM. Last Eliquis 8/15 5am.   - Start empiric IV ceftriaxone and acyclovir  - Neurology recs appreciated    //Clostridium difficile infection   -dx 8/3, last dose of PO vancomycin 8/17  -cw PO vancomycin and start IV flagyl  -hold off C diff testing given it remain positive  -Contact isolation      #Renal   -no active issues  -Zurita    #Rheum   //Hx of mixed connective tissue disorder   - C/w hydroxychloroquine 200 mg   - Solumedrol 20q8hr    #Heme   - Hold Eliquis and start heparin gtt    #PSYCH   //Alcohol withdrawal   -last drink 3 weeks ago, seizures unlikely 2/2 to withdrawal  -Consider f/u for

## 2022-07-13 ENCOUNTER — TRANSCRIPTION ENCOUNTER (OUTPATIENT)
Age: 77
End: 2022-07-13

## 2022-07-13 LAB — SARS-COV-2 N GENE NPH QL NAA+PROBE: NOT DETECTED

## 2022-07-13 NOTE — PATIENT PROFILE ADULT - FALL HARM RISK - RISK INTERVENTIONS

## 2022-07-13 NOTE — PATIENT PROFILE ADULT - OVER THE PAST TWO WEEKS HAVE YOU FELT DOWN, DEPRESSED OR HOPELESS?
Carlos Yoon), Urology  [address] [address]  Phone: [phone]  Fax: [phone]    Florida Resendiz), Internal Medicine; Medical Oncology  [address] [address]  Phone: [phone]  Fax: [phone]    Nicola Goldman), Critical Care Medicine; Pulmonary Disease  [address]  [place] [address]  Phone: [phone]  Fax: [phone] no Carlos Yoon), Urology  [address] [address]  Phone: [phone]  Fax: [phone]    [place] Resendiz), Internal Medicine; Medical Oncology  [address] [address]  Phone: [phone]  Fax: [phone]    Nicola Goldman), Critical Care Medicine; Pulmonary Disease  [address]  [place] [address]  Phone: [phone]  Fax: [phone]    Wai Tapia), Medicine  [address] [place] [address]  Phone: [phone]  Fax: [phone]

## 2022-07-14 ENCOUNTER — APPOINTMENT (OUTPATIENT)
Dept: ORTHOPEDIC SURGERY | Facility: HOSPITAL | Age: 77
End: 2022-07-14

## 2022-07-14 ENCOUNTER — TRANSCRIPTION ENCOUNTER (OUTPATIENT)
Age: 77
End: 2022-07-14

## 2022-07-14 ENCOUNTER — INPATIENT (INPATIENT)
Facility: HOSPITAL | Age: 77
LOS: 0 days | Discharge: HOME | DRG: 470 | End: 2022-07-15
Attending: ORTHOPAEDIC SURGERY | Admitting: ORTHOPAEDIC SURGERY
Payer: MEDICARE

## 2022-07-14 VITALS
DIASTOLIC BLOOD PRESSURE: 84 MMHG | HEIGHT: 68 IN | OXYGEN SATURATION: 97 % | SYSTOLIC BLOOD PRESSURE: 142 MMHG | RESPIRATION RATE: 16 BRPM | WEIGHT: 205.03 LBS | HEART RATE: 76 BPM | TEMPERATURE: 98 F

## 2022-07-14 DIAGNOSIS — M16.11 UNILATERAL PRIMARY OSTEOARTHRITIS, RIGHT HIP: ICD-10-CM

## 2022-07-14 DIAGNOSIS — Z98.49 CATARACT EXTRACTION STATUS, UNSPECIFIED EYE: Chronic | ICD-10-CM

## 2022-07-14 DIAGNOSIS — J38.1 POLYP OF VOCAL CORD AND LARYNX: Chronic | ICD-10-CM

## 2022-07-14 LAB
GLUCOSE BLDC GLUCOMTR-MCNC: 121 MG/DL — HIGH (ref 70–99)
GLUCOSE BLDC GLUCOMTR-MCNC: 90 MG/DL — SIGNIFICANT CHANGE UP (ref 70–99)

## 2022-07-14 PROCEDURE — 73502 X-RAY EXAM HIP UNI 2-3 VIEWS: CPT | Mod: 26

## 2022-07-14 PROCEDURE — 27130 TOTAL HIP ARTHROPLASTY: CPT | Mod: RT

## 2022-07-14 PROCEDURE — 99222 1ST HOSP IP/OBS MODERATE 55: CPT

## 2022-07-14 PROCEDURE — 27130 TOTAL HIP ARTHROPLASTY: CPT | Mod: AS,RT

## 2022-07-14 DEVICE — SCREW BONE SLF TAP 6.5X40MM: Type: IMPLANTABLE DEVICE | Status: FUNCTIONAL

## 2022-07-14 DEVICE — SHELL ACET CONT TRAB METAL KK HEMISPHR 56MM: Type: IMPLANTABLE DEVICE | Status: FUNCTIONAL

## 2022-07-14 DEVICE — ADAPTER TPR MINUS 6 BIOLOX: Type: IMPLANTABLE DEVICE | Status: FUNCTIONAL

## 2022-07-14 DEVICE — SCREW SELF TAP 6.5X30MM: Type: IMPLANTABLE DEVICE | Status: FUNCTIONAL

## 2022-07-14 DEVICE — IMPLANTABLE DEVICE: Type: IMPLANTABLE DEVICE | Status: FUNCTIONAL

## 2022-07-14 DEVICE — CEMENT BONE PALACOS R W/O GENTAMICIN 1X40: Type: IMPLANTABLE DEVICE | Status: FUNCTIONAL

## 2022-07-14 DEVICE — FEMORAL BONE CEMENT KIT: Type: IMPLANTABLE DEVICE | Status: FUNCTIONAL

## 2022-07-14 DEVICE — HEAD CERAMIC BIOLOX 36MM DELTA OPTION: Type: IMPLANTABLE DEVICE | Status: FUNCTIONAL

## 2022-07-14 RX ORDER — HYDROMORPHONE HYDROCHLORIDE 2 MG/ML
0.5 INJECTION INTRAMUSCULAR; INTRAVENOUS; SUBCUTANEOUS
Refills: 0 | Status: DISCONTINUED | OUTPATIENT
Start: 2022-07-14 | End: 2022-07-15

## 2022-07-14 RX ORDER — THIAMINE MONONITRATE (VIT B1) 100 MG
100 TABLET ORAL DAILY
Refills: 0 | Status: DISCONTINUED | OUTPATIENT
Start: 2022-07-14 | End: 2022-07-15

## 2022-07-14 RX ORDER — FOLIC ACID 0.8 MG
1 TABLET ORAL DAILY
Refills: 0 | Status: DISCONTINUED | OUTPATIENT
Start: 2022-07-14 | End: 2022-07-15

## 2022-07-14 RX ORDER — HYDROMORPHONE HYDROCHLORIDE 2 MG/ML
1 INJECTION INTRAMUSCULAR; INTRAVENOUS; SUBCUTANEOUS
Refills: 0 | Status: DISCONTINUED | OUTPATIENT
Start: 2022-07-14 | End: 2022-07-15

## 2022-07-14 RX ORDER — SENNA PLUS 8.6 MG/1
2 TABLET ORAL AT BEDTIME
Refills: 0 | Status: DISCONTINUED | OUTPATIENT
Start: 2022-07-14 | End: 2022-07-15

## 2022-07-14 RX ORDER — MAGNESIUM HYDROXIDE 400 MG/1
30 TABLET, CHEWABLE ORAL DAILY
Refills: 0 | Status: DISCONTINUED | OUTPATIENT
Start: 2022-07-14 | End: 2022-07-15

## 2022-07-14 RX ORDER — PANTOPRAZOLE SODIUM 20 MG/1
40 TABLET, DELAYED RELEASE ORAL
Refills: 0 | Status: DISCONTINUED | OUTPATIENT
Start: 2022-07-14 | End: 2022-07-15

## 2022-07-14 RX ORDER — HYDROMORPHONE HYDROCHLORIDE 2 MG/ML
4 INJECTION INTRAMUSCULAR; INTRAVENOUS; SUBCUTANEOUS EVERY 4 HOURS
Refills: 0 | Status: DISCONTINUED | OUTPATIENT
Start: 2022-07-14 | End: 2022-07-15

## 2022-07-14 RX ORDER — SODIUM CHLORIDE 9 MG/ML
1000 INJECTION INTRAMUSCULAR; INTRAVENOUS; SUBCUTANEOUS
Refills: 0 | Status: DISCONTINUED | OUTPATIENT
Start: 2022-07-14 | End: 2022-07-15

## 2022-07-14 RX ORDER — ONDANSETRON 8 MG/1
4 TABLET, FILM COATED ORAL EVERY 6 HOURS
Refills: 0 | Status: DISCONTINUED | OUTPATIENT
Start: 2022-07-14 | End: 2022-07-15

## 2022-07-14 RX ORDER — NICOTINE POLACRILEX 2 MG
1 GUM BUCCAL DAILY
Refills: 0 | Status: DISCONTINUED | OUTPATIENT
Start: 2022-07-14 | End: 2022-07-15

## 2022-07-14 RX ORDER — TRANEXAMIC ACID 100 MG/ML
1000 INJECTION, SOLUTION INTRAVENOUS ONCE
Refills: 0 | Status: DISCONTINUED | OUTPATIENT
Start: 2022-07-14 | End: 2022-07-14

## 2022-07-14 RX ORDER — RISPERIDONE 4 MG/1
0.5 TABLET ORAL
Refills: 0 | Status: DISCONTINUED | OUTPATIENT
Start: 2022-07-14 | End: 2022-07-15

## 2022-07-14 RX ORDER — APREPITANT 80 MG/1
40 CAPSULE ORAL ONCE
Refills: 0 | Status: COMPLETED | OUTPATIENT
Start: 2022-07-14 | End: 2022-07-14

## 2022-07-14 RX ORDER — ACETAMINOPHEN 500 MG
975 TABLET ORAL EVERY 8 HOURS
Refills: 0 | Status: DISCONTINUED | OUTPATIENT
Start: 2022-07-14 | End: 2022-07-15

## 2022-07-14 RX ORDER — POLYETHYLENE GLYCOL 3350 17 G/17G
17 POWDER, FOR SOLUTION ORAL AT BEDTIME
Refills: 0 | Status: DISCONTINUED | OUTPATIENT
Start: 2022-07-14 | End: 2022-07-15

## 2022-07-14 RX ORDER — HYDROMORPHONE HYDROCHLORIDE 2 MG/ML
0.5 INJECTION INTRAMUSCULAR; INTRAVENOUS; SUBCUTANEOUS EVERY 6 HOURS
Refills: 0 | Status: DISCONTINUED | OUTPATIENT
Start: 2022-07-14 | End: 2022-07-15

## 2022-07-14 RX ORDER — HYDROMORPHONE HYDROCHLORIDE 2 MG/ML
0.5 INJECTION INTRAMUSCULAR; INTRAVENOUS; SUBCUTANEOUS
Refills: 0 | Status: DISCONTINUED | OUTPATIENT
Start: 2022-07-14 | End: 2022-07-14

## 2022-07-14 RX ORDER — CELECOXIB 200 MG/1
200 CAPSULE ORAL EVERY 12 HOURS
Refills: 0 | Status: DISCONTINUED | OUTPATIENT
Start: 2022-07-15 | End: 2022-07-15

## 2022-07-14 RX ORDER — ATORVASTATIN CALCIUM 80 MG/1
10 TABLET, FILM COATED ORAL AT BEDTIME
Refills: 0 | Status: DISCONTINUED | OUTPATIENT
Start: 2022-07-14 | End: 2022-07-15

## 2022-07-14 RX ORDER — TRAZODONE HCL 50 MG
150 TABLET ORAL DAILY
Refills: 0 | Status: DISCONTINUED | OUTPATIENT
Start: 2022-07-14 | End: 2022-07-15

## 2022-07-14 RX ORDER — ASPIRIN/CALCIUM CARB/MAGNESIUM 324 MG
325 TABLET ORAL
Refills: 0 | Status: DISCONTINUED | OUTPATIENT
Start: 2022-07-14 | End: 2022-07-15

## 2022-07-14 RX ORDER — HYDROMORPHONE HYDROCHLORIDE 2 MG/ML
1 INJECTION INTRAMUSCULAR; INTRAVENOUS; SUBCUTANEOUS
Refills: 0 | Status: DISCONTINUED | OUTPATIENT
Start: 2022-07-14 | End: 2022-07-14

## 2022-07-14 RX ORDER — ACETAMINOPHEN 500 MG
975 TABLET ORAL ONCE
Refills: 0 | Status: COMPLETED | OUTPATIENT
Start: 2022-07-14 | End: 2022-07-14

## 2022-07-14 RX ORDER — OXYCODONE HYDROCHLORIDE 5 MG/1
5 TABLET ORAL
Refills: 0 | Status: DISCONTINUED | OUTPATIENT
Start: 2022-07-14 | End: 2022-07-15

## 2022-07-14 RX ORDER — CEFAZOLIN SODIUM 1 G
2000 VIAL (EA) INJECTION ONCE
Refills: 0 | Status: DISCONTINUED | OUTPATIENT
Start: 2022-07-14 | End: 2022-07-14

## 2022-07-14 RX ORDER — ONDANSETRON 8 MG/1
4 TABLET, FILM COATED ORAL ONCE
Refills: 0 | Status: DISCONTINUED | OUTPATIENT
Start: 2022-07-14 | End: 2022-07-14

## 2022-07-14 RX ORDER — SODIUM CHLORIDE 9 MG/ML
1000 INJECTION, SOLUTION INTRAVENOUS
Refills: 0 | Status: DISCONTINUED | OUTPATIENT
Start: 2022-07-14 | End: 2022-07-14

## 2022-07-14 RX ORDER — CELECOXIB 200 MG/1
400 CAPSULE ORAL ONCE
Refills: 0 | Status: COMPLETED | OUTPATIENT
Start: 2022-07-14 | End: 2022-07-14

## 2022-07-14 RX ORDER — ONDANSETRON 8 MG/1
4 TABLET, FILM COATED ORAL ONCE
Refills: 0 | Status: DISCONTINUED | OUTPATIENT
Start: 2022-07-14 | End: 2022-07-15

## 2022-07-14 RX ORDER — CEFAZOLIN SODIUM 1 G
2000 VIAL (EA) INJECTION
Refills: 0 | Status: COMPLETED | OUTPATIENT
Start: 2022-07-14 | End: 2022-07-15

## 2022-07-14 RX ORDER — KETOROLAC TROMETHAMINE 30 MG/ML
15 SYRINGE (ML) INJECTION EVERY 6 HOURS
Refills: 0 | Status: COMPLETED | OUTPATIENT
Start: 2022-07-14 | End: 2022-07-15

## 2022-07-14 RX ORDER — OXYCODONE HYDROCHLORIDE 5 MG/1
10 TABLET ORAL
Refills: 0 | Status: DISCONTINUED | OUTPATIENT
Start: 2022-07-14 | End: 2022-07-15

## 2022-07-14 RX ADMIN — Medication 975 MILLIGRAM(S): at 10:09

## 2022-07-14 RX ADMIN — POLYETHYLENE GLYCOL 3350 17 GRAM(S): 17 POWDER, FOR SOLUTION ORAL at 21:38

## 2022-07-14 RX ADMIN — SENNA PLUS 2 TABLET(S): 8.6 TABLET ORAL at 21:37

## 2022-07-14 RX ADMIN — HYDROMORPHONE HYDROCHLORIDE 0.5 MILLIGRAM(S): 2 INJECTION INTRAMUSCULAR; INTRAVENOUS; SUBCUTANEOUS at 17:40

## 2022-07-14 RX ADMIN — Medication 975 MILLIGRAM(S): at 21:36

## 2022-07-14 RX ADMIN — CELECOXIB 400 MILLIGRAM(S): 200 CAPSULE ORAL at 10:08

## 2022-07-14 RX ADMIN — Medication 100 MILLIGRAM(S): at 18:47

## 2022-07-14 RX ADMIN — Medication 15 MILLIGRAM(S): at 23:15

## 2022-07-14 RX ADMIN — HYDROMORPHONE HYDROCHLORIDE 4 MILLIGRAM(S): 2 INJECTION INTRAMUSCULAR; INTRAVENOUS; SUBCUTANEOUS at 21:36

## 2022-07-14 RX ADMIN — APREPITANT 40 MILLIGRAM(S): 80 CAPSULE ORAL at 10:09

## 2022-07-14 RX ADMIN — Medication 975 MILLIGRAM(S): at 22:36

## 2022-07-14 RX ADMIN — HYDROMORPHONE HYDROCHLORIDE 4 MILLIGRAM(S): 2 INJECTION INTRAMUSCULAR; INTRAVENOUS; SUBCUTANEOUS at 22:36

## 2022-07-14 RX ADMIN — Medication 15 MILLIGRAM(S): at 23:30

## 2022-07-14 RX ADMIN — OXYCODONE HYDROCHLORIDE 10 MILLIGRAM(S): 5 TABLET ORAL at 18:46

## 2022-07-14 RX ADMIN — SODIUM CHLORIDE 100 MILLILITER(S): 9 INJECTION INTRAMUSCULAR; INTRAVENOUS; SUBCUTANEOUS at 21:39

## 2022-07-14 RX ADMIN — SODIUM CHLORIDE 3 MILLILITER(S): 9 INJECTION INTRAMUSCULAR; INTRAVENOUS; SUBCUTANEOUS at 21:40

## 2022-07-14 RX ADMIN — ATORVASTATIN CALCIUM 10 MILLIGRAM(S): 80 TABLET, FILM COATED ORAL at 21:37

## 2022-07-14 NOTE — DISCHARGE NOTE PROVIDER - NSDCMRMEDTOKEN_GEN_ALL_CORE_FT
acetaminophen 325 mg oral tablet:   Advil 200 mg oral tablet: orally prn  atorvastatin 10 mg oral tablet: 1 tab(s) orally once a day  hydrocodone 10 mg-acetaminophen 325 mg tablet:   krill oil: orally once a day  Metamucil:   risperiDONE 0.5 mg oral tablet: 1 tab(s) orally 2 times a day  trazodone 150 mg tablet:   Vitamin D2: orally once a day   aspirin 325 mg oral delayed release tablet: 1 tab(s) orally 2 times a day x 6 weeks  atorvastatin 10 mg oral tablet: 1 tab(s) orally once a day  celecoxib 200 mg oral capsule: 1 cap(s) orally every 12 hours  Dilaudid 2 mg oral tablet: 1 tab(s) orally every 6 hours, As Needed MDD:4  krill oil: orally once a day  Narcan 4 mg/0.1 mL nasal spray: 1 spray(s) intranasally once   nicotine 21 mg/24 hr transdermal film, extended release: 1 patch transdermal once a day  omeprazole 40 mg oral delayed release capsule: 1 cap(s) orally once a day, before breakfast  risperiDONE 0.5 mg oral tablet: 1 tab(s) orally 2 times a day  Senna S 50 mg-8.6 mg oral tablet: 2 tab(s) orally once a day (at bedtime)   trazodone 150 mg tablet: 1 tab(s) orally once a day  Vitamin D2: orally once a day

## 2022-07-14 NOTE — PHYSICAL THERAPY INITIAL EVALUATION ADULT - ADDITIONAL COMMENTS
pt lives with spouse with 3 CARMEN with handrails and bed&bath on ground level. pt ambulated with SAC and independent in all ADLs. pt has RW, SAC and commode dme ath ome

## 2022-07-14 NOTE — DISCHARGE NOTE PROVIDER - NSDCFUSCHEDAPPT_GEN_ALL_CORE_FT
Alphonse Chacon  [facility]  ORTHOSURG [address]  Scheduled Appointment: 08/01/2022    Elebiary, Yeon J  [facility]  ORTHORG [address]  Scheduled Appointment: 09/12/2022

## 2022-07-14 NOTE — DISCHARGE NOTE PROVIDER - CARE PROVIDER_API CALL
Alphonse Chacon)  Orthopaedic Surgery  [address] [facility], [facility] [address]  [place] [address]  Phone: [phone]  Fax: [phone]  Follow Up Time:

## 2022-07-14 NOTE — CONSULT NOTE ADULT - ASSESSMENT
Pt is a 77 years -old male with medical hx includes b/l knee pain, HLD, current smoker, bipolar disorder, obese (BMI 31). Pt report longstanding b/l hip and lumbar region pain, right hip hip worse than his left hip. Pt denies any trauma to site. Pt sees management about 1-2 years ago for low back pain, Pt report prior he tried spine injection which did not help and he was told that he needs a hip replacement. Pt  is on hydrocodone daily and Prn Tylenol, requires the use of a walker to assist with stability.    1. R hip chronic pain / OA   S/P R ALYCE ,   PT/OT/pain mgmt  DVT prophylaxis- as per ortho  Abx as per SCIP  Incentive spirometry  Prophylaxis of opioid  induced constipation.     2. Hx of Bipolar - continue home risperiDONE 0.5 mg oral tablet: 1 tab(s) orally 2 times a day,   trazodone 150 mg tablet daily     3. HLD- continue home dose statins     4. Current smoker - 1 PPD X 66 YS - consider Nicotine patch     5. Daily alcohol use - add MVI / Folic acid / Thiamine for presumed thiamine deficiency ,   observe for alcohol withdrawal     6. DVT prophylaxis  - as per ortho protocol  Opioid induced constipation  prophylaxis - bowel regimen     Thank you for the courtesy of this consult ,   will follow along with you .

## 2022-07-14 NOTE — PHYSICAL THERAPY INITIAL EVALUATION ADULT - PHYSICAL ASSIST/NONPHYSICAL ASSIST: GAIT, REHAB EVAL
required increased assistance + verbal cues to help maintain proper upright walking posture/pattern + for proper use/sequencing of AD./1 person assist

## 2022-07-14 NOTE — PHYSICAL THERAPY INITIAL EVALUATION ADULT - CRITERIA FOR SKILLED THERAPEUTIC INTERVENTIONS
impairments found/functional limitations in following categories/risk reduction/prevention/rehab potential/therapy frequency/predicted duration of therapy intervention/anticipated equipment needs at discharge/anticipated discharge recommendation
Carri

## 2022-07-14 NOTE — PHYSICAL THERAPY INITIAL EVALUATION ADULT - GENERAL OBSERVATIONS, REHAB EVAL
Pt received on 2BRK, pt ok for PT by CHERYLE Ruiz. xray read + confirmed implant in proper alignment and no fxs/dislocations. pt observed semi-stinson in bed with IV lock, abduction pillow, pleasant, cooperative, A&O and c/o 0/10 pain t/o eval.

## 2022-07-14 NOTE — ASU PREOP CHECKLIST - SELECT TESTS ORDERED
SLP orders received and acknowledged. Per RN, pt to remain NPO pending colonoscopy this PM. SLP to follow up as pt cleared for PO. Please contact SLP with any questions, concerns, and/or change in status. Thank you. Amy M. Terri Snellen. Natty Fair POCT Blood Glucose

## 2022-07-14 NOTE — CONSULT NOTE ADULT - SUBJECTIVE AND OBJECTIVE BOX
PMD :  Cardio :     Patient is a 77y old  Male who is s/p R ALYCE , POD #0 . Seen and examined on PACU , tolerated procedure well .     CC: R hip chronic pain     HPI:  Pt is a 77 years -old male with medical hx includes b/l knee pain, HLD, current smoker, bipolar disorder, obese (BMI 31). Pt report longstanding b/l hip and lumbar region pain, right hip hip worse than his left hip. Pt denies any trauma to site. Pt sees management about 1-2 years ago for low back pain, Pt report prior he tried spine injection which did not help and he was told that he needs a hip replacement. Pt  is on hydrocodone daily and Prn Tylenol, requires the use of a walker to assist with stability.      PAST MEDICAL & SURGICAL HISTORY:  Hyperlipidemia      Gout      Hypertension      Current smoker      Unilateral primary osteoarthritis, right hip      History of bipolar disorder      At risk for sleep apnea      Obese  BMI 31      History of cataract surgery      Vocal cord polyps          Social History:  Tobacco - current smoker , 1 PPD since age of 11 yrs aold   ETOH - drinks 1-2  shots of vodka daily ,   last drink 2 days prior surgery   Illicit drug abuse - denies    FAMILY HISTORY:  FH: diabetes mellitus (Father)    Family history of osteoarthritis (Mother)        Allergies    No Known Allergies    Intolerances        HOME MEDICATIONS :     acetaminophen 325 mg oral tablet:  (14 Jul 2022 09:54)  Advil 200 mg oral tablet: orally prn (14 Jul 2022 09:54)  atorvastatin 10 mg oral tablet: 1 tab(s) orally once a day (14 Jul 2022 09:54)  hydrocodone 10 mg-acetaminophen 325 mg tablet:  (14 Jul 2022 09:54)  krill oil: orally once a day (14 Jul 2022 09:54)  Metamucil:  (14 Jul 2022 09:54)  risperiDONE 0.5 mg oral tablet: 1 tab(s) orally 2 times a day (14 Jul 2022 09:54)  trazodone 150 mg tablet:  (14 Jul 2022 09:54)  Vitamin D2: orally once a day (14 Jul 2022 09:54)      REVIEW OF SYSTEMS:    R hip chronic pain , all other systems are reviewed and are negative .    MEDICATIONS  (STANDING):  ceFAZolin   IVPB 2000 milliGRAM(s) IV Intermittent <User Schedule>  lactated ringers. 1000 milliLiter(s) (75 mL/Hr) IV Continuous <Continuous>    MEDICATIONS  (PRN):  HYDROmorphone  Injectable 0.5 milliGRAM(s) IV Push every 10 minutes PRN Moderate Pain (4 - 6)  HYDROmorphone  Injectable 1 milliGRAM(s) IV Push every 10 minutes PRN Severe Pain (7 - 10)  ondansetron Injectable 4 milliGRAM(s) IV Push once PRN Nausea and/or Vomiting      Vital Signs Last 24 Hrs  T(C): 36.7 (14 Jul 2022 15:00), Max: 36.8 (14 Jul 2022 09:28)  T(F): 98 (14 Jul 2022 15:00), Max: 98.3 (14 Jul 2022 09:28)  HR: 66 (14 Jul 2022 16:00) (52 - 76)  BP: 128/72 (14 Jul 2022 16:00) (107/70 - 142/84)  BP(mean): --  RR: 15 (14 Jul 2022 16:00) (11 - 17)  SpO2: 100% (14 Jul 2022 16:00) (97% - 100%)    Parameters below as of 14 Jul 2022 16:00  Patient On (Oxygen Delivery Method): nasal cannula  O2 Flow (L/min): 2      PHYSICAL EXAM:    GENERAL: NAD, well-groomed, well-developed  HEAD:  Atraumatic, Normocephalic  EYES: EOMI, PERRLA, conjunctiva and sclera clear  NECK: Supple, No JVD, Normal thyroid  NERVOUS SYSTEM:  Alert & Oriented X4, no focal deficit   CHEST/LUNG: CTA  b/l,  no rales, rhonchi, wheezing, or rubs  HEART: Regular rate and rhythm; No murmurs, rubs, or gallops  ABDOMEN: Soft, Nontender, Nondistended; Bowel sounds present  EXTREMITIES:  2+ Peripheral Pulses, No clubbing, cyanosis, or edema ,   LYMPH: No lymphadenopathy noted  SKIN: No rashes or lesions    LABS: Pending     RADIOLOGY & ADDITIONAL STUDIES:

## 2022-07-14 NOTE — DISCHARGE NOTE PROVIDER - NSDCFUADDINST_GEN_ALL_CORE_FT
The patient will be seen in the office between 2-3 weeks for wound check.   **Your first post-operative visit has been scheduled prior to your admission. PLEASE CONTACT OFFICE TO CONFIRM THE APPOINTMENT DATE. Sutures/Tape will be removed at that time.  **  The silver based dressing is to be removed 7 days from the date of surgery.   ** CONTACT THE OFFICE IF THE FOLLOWING DEVELOP:  - the dressing becomes soiled or saturated  - you develop a fever greater that 101F  - the wound becomes red or you develop blistering around the wound  * Patient may shower after post-op day #3.   * The patient will continue home PT consistent with posterior total hip replacement protocol and will continue to practice posterior total hip precautions for a minimum of 6 week. Transition to outpatient PT will occur at the time of the first office visit.   * The patient is FULL weight bearing.  * The patient will continue ASPIRIN 325mg twice daily for 6 weeks after surgery for blood clot prevention.  * While on aspirin, the patient will take daily omeprazole or other similar medication to protect the stomach from irritation.   * The patient will take OXYCODONE AND TYLENOL for pain control and adjust according to prescription and patient needs. Contact the office if pain increases while taking prescribed pain medications or related concerns develop.  * Celebrex will be taken twice daily for 3 weeks for pain control and prevention of excessive bone growth. Additional prescription may be requested at your office follow-up visit.  * The patient will take Senna S while taking oxycodone to prevent narcotic associated constipation.  Additionally, increase water intake (drink at least 8 glasses of water daily) and try adding fiber to the diet by eating fruits, vegetables and foods that are rich in grains. If constipation is experienced, contact the medical/primary care provider to discuss further treatment options.  * To avoid injury at home:  - continue use of rolling walker until cleared by physical therapist  - have family or friend remove all throw rug or objects in hallways that may present a trip hazard.  - if you experience any dizziness or medical concerns, call your medical doctor or  911.  * The implant may activate metal detection devices.

## 2022-07-14 NOTE — DISCHARGE NOTE PROVIDER - HOSPITAL COURSE
The patient underwent a RIGHT POSTERIOR TOTAL HIP REPLACEMENT on 7/14/22. The patient received antibiotics consistent with SCIP guidelines. The patient underwent the procedure and had no intra-operative complications. Post-operatively, the patient was seen by medicine and PT. The patient received ASPIRIN for DVTP. The patient received pain medications per orthopedic pain management protocol and the pain was appropriately controlled. Patient was instructed on posterior total hip precautions by PT. The patient did not have any post-operative medical complications. The patient was discharged in stable condition.

## 2022-07-14 NOTE — PROGRESS NOTE ADULT - SUBJECTIVE AND OBJECTIVE BOX
Patient was seen and examined at approximately 07-14-22     ORTHO-TOTAL JOINT SERVICE      KEYANA PALMA    [id number]    History: Patient is a 77y Male status post right posterior total hip arthroplasty on 7/14. Patient is doing well. The patient's pain is controlled using the prescribed pain medications. The patient is participating in physical therapy. Denies nausea, vomiting, chest pain, shortness of breath, abdominal pain or fever. No new complaints.    Vital Signs Last 24 Hrs  T(C): 36.4 (14 Jul 2022 20:00), Max: 36.8 (14 Jul 2022 09:28)  T(F): 97.5 (14 Jul 2022 20:00), Max: 98.3 (14 Jul 2022 09:28)  HR: 82 (14 Jul 2022 20:00) (52 - 82)  BP: 110/65 (14 Jul 2022 20:00) (107/70 - 142/84)  BP(mean): --  RR: 18 (14 Jul 2022 20:00) (11 - 19)  SpO2: 96% (14 Jul 2022 20:00) (96% - 100%)    Parameters below as of 14 Jul 2022 20:00  Patient On (Oxygen Delivery Method): room air        MEDICATIONS  (STANDING):  acetaminophen     Tablet .. 975 milliGRAM(s) Oral every 8 hours  aspirin enteric coated 325 milliGRAM(s) Oral two times a day  atorvastatin 10 milliGRAM(s) Oral at bedtime  ceFAZolin   IVPB 2000 milliGRAM(s) IV Intermittent <User Schedule>  folic acid 1 milliGRAM(s) Oral daily  HYDROmorphone  Injectable 0.5 milliGRAM(s) IV Push every 6 hours  ketorolac   Injectable 15 milliGRAM(s) IV Push every 6 hours  multivitamin 1 Tablet(s) Oral daily  nicotine - 21 mG/24Hr(s) Patch 1 Patch Transdermal daily  pantoprazole    Tablet 40 milliGRAM(s) Oral before breakfast  polyethylene glycol 3350 17 Gram(s) Oral at bedtime  risperiDONE   Tablet 0.5 milliGRAM(s) Oral two times a day  senna 2 Tablet(s) Oral at bedtime  sodium chloride 0.9% lock flush 3 milliLiter(s) IV Push every 8 hours  sodium chloride 0.9%. 1000 milliLiter(s) (100 mL/Hr) IV Continuous <Continuous>  thiamine 100 milliGRAM(s) Oral daily  traZODone 150 milliGRAM(s) Oral daily    MEDICATIONS  (PRN):  aluminum hydroxide/magnesium hydroxide/simethicone Suspension 30 milliLiter(s) Oral four times a day PRN Indigestion  HYDROmorphone   Tablet 4 milliGRAM(s) Oral every 4 hours PRN Severe Pain (7 - 10)  HYDROmorphone  Injectable 0.5 milliGRAM(s) IV Push every 10 minutes PRN Moderate Pain (4 - 6)  HYDROmorphone  Injectable 1 milliGRAM(s) IV Push every 10 minutes PRN Severe Pain (7 - 10)  magnesium hydroxide Suspension 30 milliLiter(s) Oral daily PRN Constipation  ondansetron Injectable 4 milliGRAM(s) IV Push every 6 hours PRN Nausea and/or Vomiting  ondansetron Injectable 4 milliGRAM(s) IV Push once PRN Nausea and/or Vomiting  oxyCODONE    IR 5 milliGRAM(s) Oral every 3 hours PRN Mild Pain (1 - 3)  oxyCODONE    IR 10 milliGRAM(s) Oral every 3 hours PRN Moderate Pain (4 - 6)      Physical exam: The right hip dressing is clean, dry and intact. No drainage or discharge. No erythema is noted. No blistering. No ecchymosis. The calf is supple nontender. No calf tenderness. Sensation to light touch is grossly intact distally. Motor function distally is 5/5. No foot drop. 2+ dorsalis pedis pulse. Capillary refill is less than 2 seconds. No cyanosis.    Primary Orthopedic Assessment:  • s/p RIGHT POSTERIOR total hip replacement    Secondary  Orthopedic Assessment(s):   •     Secondary  Medical Assessment(s):   •     Plan:   • DVT prophylaxis as prescribed ASA 325mg BID, including use of compression devices and ankle pumps  • Continue physical therapy  • Weightbearing as tolerated of the right lower extremity with assistance of a walker  • Incentive spirometry encouraged  • Pain control as clinically indicated  • Posterior hip precautions reviewed with patient  • Discharge planning

## 2022-07-15 ENCOUNTER — TRANSCRIPTION ENCOUNTER (OUTPATIENT)
Age: 77
End: 2022-07-15

## 2022-07-15 VITALS
DIASTOLIC BLOOD PRESSURE: 89 MMHG | HEART RATE: 91 BPM | OXYGEN SATURATION: 94 % | TEMPERATURE: 98 F | SYSTOLIC BLOOD PRESSURE: 151 MMHG | RESPIRATION RATE: 18 BRPM

## 2022-07-15 LAB
ANION GAP SERPL CALC-SCNC: 11 MMOL/L — SIGNIFICANT CHANGE UP (ref 5–17)
BUN SERPL-MCNC: 11.5 MG/DL — SIGNIFICANT CHANGE UP (ref 8–20)
CALCIUM SERPL-MCNC: 8.1 MG/DL — LOW (ref 8.6–10.2)
CHLORIDE SERPL-SCNC: 104 MMOL/L — SIGNIFICANT CHANGE UP (ref 98–107)
CO2 SERPL-SCNC: 24 MMOL/L — SIGNIFICANT CHANGE UP (ref 22–29)
CREAT SERPL-MCNC: 0.81 MG/DL — SIGNIFICANT CHANGE UP (ref 0.5–1.3)
EGFR: 90 ML/MIN/1.73M2 — SIGNIFICANT CHANGE UP
GLUCOSE SERPL-MCNC: 155 MG/DL — HIGH (ref 70–99)
HCT VFR BLD CALC: 37.9 % — LOW (ref 39–50)
HGB BLD-MCNC: 12.4 G/DL — LOW (ref 13–17)
MCHC RBC-ENTMCNC: 30.1 PG — SIGNIFICANT CHANGE UP (ref 27–34)
MCHC RBC-ENTMCNC: 32.7 GM/DL — SIGNIFICANT CHANGE UP (ref 32–36)
MCV RBC AUTO: 92 FL — SIGNIFICANT CHANGE UP (ref 80–100)
PLATELET # BLD AUTO: 177 K/UL — SIGNIFICANT CHANGE UP (ref 150–400)
POTASSIUM SERPL-MCNC: 3.4 MMOL/L — LOW (ref 3.5–5.3)
POTASSIUM SERPL-SCNC: 3.4 MMOL/L — LOW (ref 3.5–5.3)
RBC # BLD: 4.12 M/UL — LOW (ref 4.2–5.8)
RBC # FLD: 13.2 % — SIGNIFICANT CHANGE UP (ref 10.3–14.5)
SODIUM SERPL-SCNC: 138 MMOL/L — SIGNIFICANT CHANGE UP (ref 135–145)
WBC # BLD: 9.4 K/UL — SIGNIFICANT CHANGE UP (ref 3.8–10.5)
WBC # FLD AUTO: 9.4 K/UL — SIGNIFICANT CHANGE UP (ref 3.8–10.5)

## 2022-07-15 PROCEDURE — 36415 COLL VENOUS BLD VENIPUNCTURE: CPT

## 2022-07-15 PROCEDURE — 97116 GAIT TRAINING THERAPY: CPT

## 2022-07-15 PROCEDURE — 82962 GLUCOSE BLOOD TEST: CPT

## 2022-07-15 PROCEDURE — 85027 COMPLETE CBC AUTOMATED: CPT

## 2022-07-15 PROCEDURE — 97530 THERAPEUTIC ACTIVITIES: CPT

## 2022-07-15 PROCEDURE — 73502 X-RAY EXAM HIP UNI 2-3 VIEWS: CPT

## 2022-07-15 PROCEDURE — 97110 THERAPEUTIC EXERCISES: CPT

## 2022-07-15 PROCEDURE — 80048 BASIC METABOLIC PNL TOTAL CA: CPT

## 2022-07-15 PROCEDURE — 99232 SBSQ HOSP IP/OBS MODERATE 35: CPT

## 2022-07-15 PROCEDURE — C1776: CPT

## 2022-07-15 PROCEDURE — C1713: CPT

## 2022-07-15 RX ORDER — CELECOXIB 200 MG/1
1 CAPSULE ORAL
Qty: 42 | Refills: 0
Start: 2022-07-15 | End: 2022-08-04

## 2022-07-15 RX ORDER — TRAZODONE HCL 50 MG
0 TABLET ORAL
Qty: 0 | Refills: 0 | DISCHARGE

## 2022-07-15 RX ORDER — POTASSIUM CHLORIDE 20 MEQ
20 PACKET (EA) ORAL
Refills: 0 | Status: COMPLETED | OUTPATIENT
Start: 2022-07-15 | End: 2022-07-15

## 2022-07-15 RX ORDER — IBUPROFEN 200 MG
0 TABLET ORAL
Qty: 0 | Refills: 0 | DISCHARGE

## 2022-07-15 RX ORDER — SENNOSIDES/DOCUSATE SODIUM 8.6MG-50MG
2 TABLET ORAL
Qty: 20 | Refills: 0
Start: 2022-07-15

## 2022-07-15 RX ORDER — HYDROCODONE BITARTRATE AND ACETAMINOPHEN 7.5; 325 MG/15ML; MG/15ML
0 SOLUTION ORAL
Qty: 0 | Refills: 0 | DISCHARGE

## 2022-07-15 RX ORDER — HYDROMORPHONE HYDROCHLORIDE 2 MG/ML
1 INJECTION INTRAMUSCULAR; INTRAVENOUS; SUBCUTANEOUS
Qty: 28 | Refills: 0
Start: 2022-07-15

## 2022-07-15 RX ORDER — NICOTINE POLACRILEX 2 MG
1 GUM BUCCAL
Qty: 0 | Refills: 0 | DISCHARGE
Start: 2022-07-15

## 2022-07-15 RX ORDER — NALOXONE HYDROCHLORIDE 4 MG/.1ML
1 SPRAY NASAL
Qty: 1 | Refills: 0
Start: 2022-07-15

## 2022-07-15 RX ORDER — PSYLLIUM SEED (WITH DEXTROSE)
0 POWDER (GRAM) ORAL
Qty: 0 | Refills: 0 | DISCHARGE

## 2022-07-15 RX ORDER — ASPIRIN/CALCIUM CARB/MAGNESIUM 324 MG
1 TABLET ORAL
Qty: 84 | Refills: 0
Start: 2022-07-15

## 2022-07-15 RX ORDER — OMEPRAZOLE 10 MG/1
1 CAPSULE, DELAYED RELEASE ORAL
Qty: 45 | Refills: 0
Start: 2022-07-15 | End: 2022-08-28

## 2022-07-15 RX ADMIN — Medication 1 MILLIGRAM(S): at 12:01

## 2022-07-15 RX ADMIN — RISPERIDONE 0.5 MILLIGRAM(S): 4 TABLET ORAL at 04:47

## 2022-07-15 RX ADMIN — OXYCODONE HYDROCHLORIDE 10 MILLIGRAM(S): 5 TABLET ORAL at 09:49

## 2022-07-15 RX ADMIN — Medication 1 TABLET(S): at 12:05

## 2022-07-15 RX ADMIN — Medication 15 MILLIGRAM(S): at 12:01

## 2022-07-15 RX ADMIN — HYDROMORPHONE HYDROCHLORIDE 4 MILLIGRAM(S): 2 INJECTION INTRAMUSCULAR; INTRAVENOUS; SUBCUTANEOUS at 02:57

## 2022-07-15 RX ADMIN — Medication 100 MILLIGRAM(S): at 12:01

## 2022-07-15 RX ADMIN — Medication 15 MILLIGRAM(S): at 04:48

## 2022-07-15 RX ADMIN — OXYCODONE HYDROCHLORIDE 10 MILLIGRAM(S): 5 TABLET ORAL at 10:48

## 2022-07-15 RX ADMIN — Medication 15 MILLIGRAM(S): at 12:14

## 2022-07-15 RX ADMIN — SODIUM CHLORIDE 3 MILLILITER(S): 9 INJECTION INTRAMUSCULAR; INTRAVENOUS; SUBCUTANEOUS at 04:48

## 2022-07-15 RX ADMIN — Medication 20 MILLIEQUIVALENT(S): at 12:01

## 2022-07-15 RX ADMIN — Medication 325 MILLIGRAM(S): at 04:47

## 2022-07-15 RX ADMIN — Medication 975 MILLIGRAM(S): at 05:47

## 2022-07-15 RX ADMIN — HYDROMORPHONE HYDROCHLORIDE 0.5 MILLIGRAM(S): 2 INJECTION INTRAMUSCULAR; INTRAVENOUS; SUBCUTANEOUS at 10:23

## 2022-07-15 RX ADMIN — HYDROMORPHONE HYDROCHLORIDE 4 MILLIGRAM(S): 2 INJECTION INTRAMUSCULAR; INTRAVENOUS; SUBCUTANEOUS at 01:57

## 2022-07-15 RX ADMIN — Medication 20 MILLIEQUIVALENT(S): at 09:49

## 2022-07-15 RX ADMIN — PANTOPRAZOLE SODIUM 40 MILLIGRAM(S): 20 TABLET, DELAYED RELEASE ORAL at 04:47

## 2022-07-15 RX ADMIN — Medication 100 MILLIGRAM(S): at 04:49

## 2022-07-15 RX ADMIN — Medication 975 MILLIGRAM(S): at 04:47

## 2022-07-15 RX ADMIN — Medication 15 MILLIGRAM(S): at 05:03

## 2022-07-15 RX ADMIN — HYDROMORPHONE HYDROCHLORIDE 0.5 MILLIGRAM(S): 2 INJECTION INTRAMUSCULAR; INTRAVENOUS; SUBCUTANEOUS at 10:36

## 2022-07-15 RX ADMIN — SODIUM CHLORIDE 100 MILLILITER(S): 9 INJECTION INTRAMUSCULAR; INTRAVENOUS; SUBCUTANEOUS at 04:48

## 2022-07-15 NOTE — PROGRESS NOTE ADULT - SUBJECTIVE AND OBJECTIVE BOX
Patient seen and examined . S/p R ALYCE  , POD # 1. c/o chronic low back pain now worst from being prolog period   being on the bed , R hip well controlled . No n/v , voiding without difficulty ,   participating with physical therapy     CC :  R hip chronic pain postop well controlled , exacerbation of chronic low back pain         MEDICATIONS  (STANDING):  acetaminophen     Tablet .. 975 milliGRAM(s) Oral every 8 hours  aspirin enteric coated 325 milliGRAM(s) Oral two times a day  atorvastatin 10 milliGRAM(s) Oral at bedtime  celecoxib 200 milliGRAM(s) Oral every 12 hours  folic acid 1 milliGRAM(s) Oral daily  HYDROmorphone  Injectable 0.5 milliGRAM(s) IV Push every 6 hours  ketorolac   Injectable 15 milliGRAM(s) IV Push every 6 hours  multivitamin 1 Tablet(s) Oral daily  nicotine - 21 mG/24Hr(s) Patch 1 Patch Transdermal daily  pantoprazole    Tablet 40 milliGRAM(s) Oral before breakfast  polyethylene glycol 3350 17 Gram(s) Oral at bedtime  potassium chloride    Tablet ER 20 milliEquivalent(s) Oral every 2 hours  risperiDONE   Tablet 0.5 milliGRAM(s) Oral two times a day  senna 2 Tablet(s) Oral at bedtime  sodium chloride 0.9% lock flush 3 milliLiter(s) IV Push every 8 hours  sodium chloride 0.9%. 1000 milliLiter(s) (100 mL/Hr) IV Continuous <Continuous>  thiamine 100 milliGRAM(s) Oral daily  traZODone 150 milliGRAM(s) Oral daily    MEDICATIONS  (PRN):  aluminum hydroxide/magnesium hydroxide/simethicone Suspension 30 milliLiter(s) Oral four times a day PRN Indigestion  HYDROmorphone   Tablet 4 milliGRAM(s) Oral every 4 hours PRN Severe Pain (7 - 10)  HYDROmorphone  Injectable 0.5 milliGRAM(s) IV Push every 10 minutes PRN Moderate Pain (4 - 6)  HYDROmorphone  Injectable 1 milliGRAM(s) IV Push every 10 minutes PRN Severe Pain (7 - 10)  magnesium hydroxide Suspension 30 milliLiter(s) Oral daily PRN Constipation  ondansetron Injectable 4 milliGRAM(s) IV Push every 6 hours PRN Nausea and/or Vomiting  ondansetron Injectable 4 milliGRAM(s) IV Push once PRN Nausea and/or Vomiting  oxyCODONE    IR 5 milliGRAM(s) Oral every 3 hours PRN Mild Pain (1 - 3)  oxyCODONE    IR 10 milliGRAM(s) Oral every 3 hours PRN Moderate Pain (4 - 6)      LABS:                          12.4   9.40  )-----------( 177      ( 15 Jul 2022 06:37 )             37.9     07-15    138  |  104  |  11.5  ----------------------------<  155<H>  3.4<L>   |  24.0  |  0.81    Ca    8.1<L>      15 Jul 2022 06:37      RADIOLOGY & ADDITIONAL TESTS:          REVIEW OF SYSTEMS:    As above , all other systems are reviewed and are negative   Vital Signs Last 24 Hrs  T(C): 36.9 (15 Jul 2022 07:56), Max: 36.9 (15 Jul 2022 07:56)  T(F): 98.5 (15 Jul 2022 07:56), Max: 98.5 (15 Jul 2022 07:56)  HR: 77 (15 Jul 2022 07:56) (52 - 82)  BP: 148/84 (15 Jul 2022 07:56) (107/70 - 151/91)  BP(mean): --  RR: 18 (15 Jul 2022 07:56) (11 - 19)  SpO2: 95% (15 Jul 2022 07:56) (94% - 100%)    Parameters below as of 15 Jul 2022 07:56  Patient On (Oxygen Delivery Method): room air      PHYSICAL EXAM:    GENERAL: NAD, well-groomed, well-developed  HEAD:  Atraumatic, Normocephalic  EYES: EOMI, PERRLA, conjunctiva and sclera clear  NECK: Supple, No JVD, Normal thyroid  NERVOUS SYSTEM:  Alert & Oriented X3, no focal deficit  CHEST/LUNG: CTA b/l ,  no  rales, rhonchi, wheezing, or rubs  HEART: Regular rate and rhythm; No murmurs, rubs, or gallops  ABDOMEN: Soft, Nontender, Nondistended; Bowel sounds present  EXTREMITIES:  2+ Peripheral Pulses, No clubbing, cyanosis, or edema,   R hip dressing + , clean and dry   LYMPH: No lymphadenopathy noted  SKIN: No rashes or lesions

## 2022-07-15 NOTE — PROGRESS NOTE ADULT - SUBJECTIVE AND OBJECTIVE BOX
Patient seen and examined at bedside. comfortable in bed, pain controlled. Denies fever/chills, SOB/chest pain, abdominal pain, numbness/tingling. no complaints.    Vital Signs Last 24 Hrs  T(C): 36.7 (15 Jul 2022 04:17), Max: 36.8 (14 Jul 2022 09:28)  T(F): 98.1 (15 Jul 2022 04:17), Max: 98.3 (14 Jul 2022 09:28)  HR: 76 (15 Jul 2022 04:17) (52 - 82)  BP: 151/91 (15 Jul 2022 04:17) (107/70 - 151/91)  BP(mean): --  RR: 18 (15 Jul 2022 04:17) (11 - 19)  SpO2: 94% (15 Jul 2022 04:17) (94% - 100%)    Parameters below as of 15 Jul 2022 04:17  Patient On (Oxygen Delivery Method): room air      RLE: Dressing C/D/I. SILT. + dorsi/plantarflexion. DP 2+. calf soft NT B/L.                          12.4   9.40  )-----------( 177      ( 15 Jul 2022 06:37 )             37.9     A/P: 77 y.o M s/p right ALYCE POD #1  - WBAT, posterior hip precautions  - DVTP  - d/c planning - home today if cleared by PT and medicine Patient seen and examined at bedside. comfortable in bed, pain controlled. Denies fever/chills, SOB/chest pain, abdominal pain, numbness/tingling. no complaints.    Vital Signs Last 24 Hrs  T(C): 36.7 (15 Jul 2022 04:17), Max: 36.8 (14 Jul 2022 09:28)  T(F): 98.1 (15 Jul 2022 04:17), Max: 98.3 (14 Jul 2022 09:28)  HR: 76 (15 Jul 2022 04:17) (52 - 82)  BP: 151/91 (15 Jul 2022 04:17) (107/70 - 151/91)  BP(mean): --  RR: 18 (15 Jul 2022 04:17) (11 - 19)  SpO2: 94% (15 Jul 2022 04:17) (94% - 100%)    Parameters below as of 15 Jul 2022 04:17  Patient On (Oxygen Delivery Method): room air      RLE: Dressing C/D/I. SILT. + dorsi/plantarflexion. DP 2+. calf soft NT B/L.                          12.4   9.40  )-----------( 177      ( 15 Jul 2022 06:37 )             37.9     A/P: 77 y.o M s/p right ALYCE POD #1  - WBAT, posterior hip precautions  - DVTP  - d/c planning - home today if cleared by PT and medicine    Reference #: [id number]  patient takes hydrocodone-acetaminophen 10mg BID at home, state she "doesn't have many left"  will send dilaudid Rx, explained to patient extensively importance of not taking both hydrocodone and dilaudid together, patient verbalized to understanding and states he will not take both  narcan sent, patient educated on narcan as well

## 2022-07-15 NOTE — DISCHARGE NOTE NURSING/CASE MANAGEMENT/SOCIAL WORK - PATIENT PORTAL LINK FT
You can access the FollowMyHealth Patient Portal offered by [facility] by registering at the following website: [email or web address]. By joining [facility]’s FollowMyHealth portal, you will also be able to view your health information using other applications (apps) compatible with our system.

## 2022-07-15 NOTE — PROGRESS NOTE ADULT - ASSESSMENT
Pt is a 77 years -old male with medical hx includes b/l knee pain, HLD, current smoker, bipolar disorder, obese (BMI 31). Pt report longstanding b/l hip and lumbar region pain, right hip hip worse than his left hip. Pt denies any trauma to site. Pt sees management about 1-2 years ago for low back pain, Pt report prior he tried spine injection which did not help and he was told that he needs a hip replacement. Pt  is on hydrocodone daily and Prn Tylenol, requires the use of a walker to assist with stability.    1. R hip chronic pain / OA   S/P R ALYCE ,   PT/OT/pain mgmt  DVT prophylaxis- as per ortho  Abx as per SCIP- given   Incentive spirometry  Prophylaxis of opioid  induced constipation.     2. Hx of Bipolar - continue home risperiDONE 0.5 mg oral tablet: 1 tab(s) orally 2 times a day,   trazodone 150 mg tablet daily     3. HLD- continue home dose statins     4. Current smoker - 1 PPD X 66 yrs -  Nicotine patch recommended     5. Daily alcohol use - MVI / Folic acid / Thiamine for presumed thiamine deficiency  added    observe for alcohol withdrawal - no S/S of withdrawal this am , counseled to decrease or STOP drinking alcohol      6. DVT prophylaxis  - as per ortho protocol- ASA BID  Opioid induced constipation  prophylaxis - bowel regimen     7. Anemia - acute blood lost anemia - secondary to surgical blood lost -  asymptomatic.     8. Hypokalemia - will supplement     9. Chronic back pain due to arthritis as per patient now exacerbated with prolong stay in the bed ,   heat pads , pain meds recommended   Dispo plan is Home - likely today     D/W ortho PA/ nurse .   Medically stable to d/c once cleared by physical therapy /ortho .     Will sign off , please re call if needed .

## 2022-08-01 ENCOUNTER — APPOINTMENT (OUTPATIENT)
Dept: ORTHOPEDIC SURGERY | Facility: CLINIC | Age: 77
End: 2022-08-01

## 2022-08-01 PROCEDURE — 99024 POSTOP FOLLOW-UP VISIT: CPT

## 2022-08-01 PROCEDURE — 73502 X-RAY EXAM HIP UNI 2-3 VIEWS: CPT

## 2022-08-01 NOTE — HISTORY OF PRESENT ILLNESS
[Xray (Date:___)] : [unfilled] Xray -  [3] : the patient reports pain that is 3/10 in severity [Clean/Dry/Intact] : clean, dry and intact [Healed] : healed [Swelling] : swollen [Neuro Intact] : an unremarkable neurological exam [Vascular Intact] : ~T peripheral vascular exam normal [Negative Lay's] : maneuvers demonstrated a negative Lay's sign [Doing Well] : is doing well [Adequate Pain Control] : has adequate pain control [Chills] : no chills [Constipation] : no constipation [Diarrhea] : no diarrhea [Dysuria] : no dysuria [Fever] : no fever [Nausea] : no nausea [Vomiting] : no vomiting [Erythema] : not erythematous [Discharge] : absent of discharge [Dehiscence] : not dehisced [de-identified] : s/p right total hip arthroplasty 7/14/22 [de-identified] : 78 y/o M pt is 2 weeks s/p right ALYCE. He reports feeling a lot better post surgery. He is ambulating without any assistive devices. He is back to his baseline of hydrocodone twice a day. He does low impact exercises which also helps. He does report backside pain in left. He denies any groin pain. He did attend PT.  [de-identified] : left hip exam shows healing incision with no sign of infection.  [de-identified] : 3V xray of the ****done in office today and reviewed by Dr. Alphonse Chacon demonstrates s/p right hip implants in good positioning with no evidence of wear, loosening, or subsidence.  [de-identified] : He should continue to do low impact exercises. Patient will continue to adhere to the posterior hip precautions. He should continue to do low impact exercises. F/u with us in 3 weeks.

## 2022-08-01 NOTE — END OF VISIT
[FreeTextEntry3] : I, Johnnie Thmopson, acted solely as a scribe for Dr. Alphonse Chacon on 08/01/2022

## 2022-09-12 ENCOUNTER — APPOINTMENT (OUTPATIENT)
Dept: ORTHOPEDIC SURGERY | Facility: CLINIC | Age: 77
End: 2022-09-12

## 2022-09-12 PROCEDURE — 73502 X-RAY EXAM HIP UNI 2-3 VIEWS: CPT | Mod: 26,RT

## 2022-09-12 PROCEDURE — 99024 POSTOP FOLLOW-UP VISIT: CPT

## 2022-09-12 NOTE — HISTORY OF PRESENT ILLNESS
[1] : the patient reports pain that is 1/10 in severity [Xray (Date:___)] : [unfilled] Xray -  [Doing Well] : is doing well [No Sign of Infection] : is showing no signs of infection [Adequate Pain Control] : has adequate pain control [Chills] : no chills [Constipation] : no constipation [Diarrhea] : no diarrhea [Dysuria] : no dysuria [Fever] : no fever [Nausea] : no nausea [Vomiting] : no vomiting [de-identified] : s/p right total hip arthroplasty 7/14/22.  [de-identified] : patient is 2 months from right hip replacement and today presented walking without using walking assistive device he denies pain in the right hip however he has left hip pain.\par  He is Exercising on his own at home. He satisfied with the right hip progress. [de-identified] : right hip exam shows healing incision with no sign of infection. The surgical incision site(s) swollen, but clean, dry and intact, healed, not erythematous, absent of discharge and not dehisced. Additional findings included an unremarkable neurological exam, peripheral vascular exam normal and maneuvers demonstrated a negative Lay's sign. \par  [de-identified] :  3V xray of the right  hip done in office today and reviewed by Dr. Alphonse Chacon demonstrates s/p right hip implants in good positioning with no evidence of wear, loosening, or subsidence. \par \par  [de-identified] : He should continue to do low impact exercises. Patient will continue to adhere to the posterior hip precautions. He should continue to do low impact exercises. F/u with us in 3 weeks.

## 2022-10-17 ENCOUNTER — APPOINTMENT (OUTPATIENT)
Dept: ORTHOPEDIC SURGERY | Facility: CLINIC | Age: 77
End: 2022-10-17

## 2022-10-17 DIAGNOSIS — M16.12 UNILATERAL PRIMARY OSTEOARTHRITIS, LEFT HIP: ICD-10-CM

## 2022-10-17 DIAGNOSIS — Z96.641 PRESENCE OF RIGHT ARTIFICIAL HIP JOINT: ICD-10-CM

## 2022-10-17 PROCEDURE — 99214 OFFICE O/P EST MOD 30 MIN: CPT

## 2022-10-17 PROCEDURE — 73502 X-RAY EXAM HIP UNI 2-3 VIEWS: CPT

## 2022-10-17 NOTE — HISTORY OF PRESENT ILLNESS
[Pain Location] : pain [Stable] : stable [de-identified] : 78 y/o M pt presents 14 weeks post op from right ALYCE on 7/14/2022. The pt is doing well and is not experiencing any pain. He is happy with the surgical out come. He is interested in left hip replacement for the spring.

## 2022-10-17 NOTE — PHYSICAL EXAM
[LE] : Sensory: Intact in bilateral lower extremities [ALL] : dorsalis pedis, posterior tibial, femoral, popliteal, and radial 2+ and symmetric bilaterally [Normal] : Alert and in no acute distress [Poor Appearance] : well-appearing [de-identified] : GENERAL APPEARANCE: Well nourished and hydrated, pleasant, alert, and oriented x 3. Appears their stated age. \par HEENT: Normocephalic, extraocular eye motion intact. Nasal septum midline. Oral cavity clear. External auditory canal clear. \par RESPIRATORY: Breath sounds clear and audible in all lobes. No wheezing, No accessory muscle use.\par CARDIOVASCULAR: No apparent abnormalities. No lower leg edema. No varicosities. Pedal pulses are palpable.\par NEUROLOGIC: Sensation is normal, no muscle weakness in the upper or lower extremities.\par DERMATOLOGIC: No apparent skin lesions, moist, warm, no rash.\par SPINE: Cervical spine appears normal and moves freely; thoracic spine appears normal and moves freely; lumbosacral spine appears normal and moves freely, normal, nontender.\par MUSCULOSKELETAL: Hands, wrists, and elbows are normal and move freely, shoulders are normal and move freely.  [de-identified] : left hip exam shows groin/thigh pain with SLR, no internal rotation\par Right hip exam shows healed incision with no sign of infection. \par  [de-identified] : 1V xray of the bilateral  hip done in office today and reviewed by Dr. Alphonse Chacon demonstrates s/p right hip implants in good positioning with no evidence of wear, loosening, or subsidence and left hip shows severe left hip osteoarthritis

## 2022-10-17 NOTE — DISCUSSION/SUMMARY
[Medication Risks Reviewed] : Medication risks reviewed [Surgical risks reviewed] : Surgical risks reviewed [de-identified] : 78 y/o M pt presents s/p right ALYCE. He should continue to do low impact exercises. Pt understands the importance of prophylaxis for invasive dental procedures. Patient will continue to adhere to the posterior hip precautions. He will f/u yearly post op. \par \par Pt also has severe left hip osteoarthritis. We discussed the nature of the condition. Based on imaging, he is a candidate for a left ALYCE. He is having severe pain which is limiting his ADLs. He is losing his quality of life and is interested in pursuing the left ALYCE in spring We discussed pre-op, surgery, and post-op in detail. He is currently taking hydrocodone BID. We reviewed the risks associated with chronic opioid use and worse outcomes following surgery. He will reach out to our surgical coordinator in the near future to start the scheduling process. All questions were answered. \par \par The patient is a 77 year individual with end stage arthritis of their left hip joint. Based upon the patient's continued symptoms and failure to respond to conservative treatment I have recommended a left total hip arthroplasty for this patient. A long discussion took place with the patient describing what a total joint replacement is and what the procedure would entail. A total hip arthroplasty model, similar to the implant that will be used during the operation, was utilized to demonstrate and to discuss the various bearing surfaces of the implants. The hospitalization and post-operative care and rehabilitation were also discussed. The use of perioperative antibiotics and DVT prophylaxis were discussed. The risk, benefits and alternatives to a surgical intervention were discussed at length with the patient. The patient was also advised of risks related to the medical comorbidities, elevated body mass index (BMI), and smoking where applicable. We discussed how to reduce modifiable risk factors and encouraged smoking cessation were applicable. A lengthy discussion took place to review the most common complications including but not limited to: deep vein thrombosis, pulmonary embolus, heart attack, stroke, infection, wound breakdown, numbness, damage to nerves, tendon, muscles, arteries or other blood vessels, death and other possible complications from anesthesia. The patient was told that we will take steps to minimize these risks by using sterile technique, antibiotics and DVT prophylaxis when appropriate and follow the patient postoperatively in the office setting to monitor progress. The possibility of recurrent pain, no improvement in pain and actual worsening of pain were also discussed with the patient.\par The discharge plan of care focused on the patient going home following surgery. The patient was encouraged to make the necessary arrangements to have someone stay with them when they are discharged home. Following discharge, a home care nurse will visit the patient. The home care nurse will open your home care case and request home physical therapy services. Home physical therapy will commence following discharge provided it is appropriate and covered by the health insurance benefit plan. \par The benefits of surgery were discussed with the patient including the potential for improving his current clinical condition through operative intervention. Alternatives to surgical intervention including continued conservative management were also discussed in detail. All questions were answered to the satisfaction of the patient. The treatment plan of care, as well as a model of a total hip arthroplasty equivalent to the one that will be used for their total joint replacement, was shared with the patient. The patient agreed to the plan of care as well as the use of implants in their total joint replacement. \par \par

## 2022-10-17 NOTE — HISTORY OF PRESENT ILLNESS
[Pain Location] : pain [Stable] : stable [de-identified] : 78 y/o M pt presents 14 weeks post op from right ALYCE on 7/14/2022. The pt is doing well and is not experiencing any pain. He is happy with the surgical out come. He is interested in left hip replacement for the spring.

## 2022-10-17 NOTE — END OF VISIT
[FreeTextEntry3] : I, Johnnie Thompson, acted solely as a scribe for Dr. Alphonse Chacon on 10/17/2022

## 2022-10-17 NOTE — PHYSICAL EXAM
[LE] : Sensory: Intact in bilateral lower extremities [ALL] : dorsalis pedis, posterior tibial, femoral, popliteal, and radial 2+ and symmetric bilaterally [Normal] : Alert and in no acute distress [Poor Appearance] : well-appearing [de-identified] : GENERAL APPEARANCE: Well nourished and hydrated, pleasant, alert, and oriented x 3. Appears their stated age. \par HEENT: Normocephalic, extraocular eye motion intact. Nasal septum midline. Oral cavity clear. External auditory canal clear. \par RESPIRATORY: Breath sounds clear and audible in all lobes. No wheezing, No accessory muscle use.\par CARDIOVASCULAR: No apparent abnormalities. No lower leg edema. No varicosities. Pedal pulses are palpable.\par NEUROLOGIC: Sensation is normal, no muscle weakness in the upper or lower extremities.\par DERMATOLOGIC: No apparent skin lesions, moist, warm, no rash.\par SPINE: Cervical spine appears normal and moves freely; thoracic spine appears normal and moves freely; lumbosacral spine appears normal and moves freely, normal, nontender.\par MUSCULOSKELETAL: Hands, wrists, and elbows are normal and move freely, shoulders are normal and move freely.  [de-identified] : left hip exam shows groin/thigh pain with SLR, no internal rotation\par Right hip exam shows healed incision with no sign of infection. \par  [de-identified] : 1V xray of the bilateral  hip done in office today and reviewed by Dr. Alphonse Chacon demonstrates s/p right hip implants in good positioning with no evidence of wear, loosening, or subsidence and left hip shows severe left hip osteoarthritis

## 2022-10-17 NOTE — DISCUSSION/SUMMARY
[Medication Risks Reviewed] : Medication risks reviewed [Surgical risks reviewed] : Surgical risks reviewed [de-identified] : 76 y/o M pt presents s/p right ALYCE. He should continue to do low impact exercises. Pt understands the importance of prophylaxis for invasive dental procedures. Patient will continue to adhere to the posterior hip precautions. He will f/u yearly post op. \par \par Pt also has severe left hip osteoarthritis. We discussed the nature of the condition. Based on imaging, he is a candidate for a left ALYCE. He is having severe pain which is limiting his ADLs. He is losing his quality of life and is interested in pursuing the left ALYCE in spring We discussed pre-op, surgery, and post-op in detail. He is currently taking hydrocodone BID. We reviewed the risks associated with chronic opioid use and worse outcomes following surgery. He will reach out to our surgical coordinator in the near future to start the scheduling process. All questions were answered. \par \par The patient is a 77 year individual with end stage arthritis of their left hip joint. Based upon the patient's continued symptoms and failure to respond to conservative treatment I have recommended a left total hip arthroplasty for this patient. A long discussion took place with the patient describing what a total joint replacement is and what the procedure would entail. A total hip arthroplasty model, similar to the implant that will be used during the operation, was utilized to demonstrate and to discuss the various bearing surfaces of the implants. The hospitalization and post-operative care and rehabilitation were also discussed. The use of perioperative antibiotics and DVT prophylaxis were discussed. The risk, benefits and alternatives to a surgical intervention were discussed at length with the patient. The patient was also advised of risks related to the medical comorbidities, elevated body mass index (BMI), and smoking where applicable. We discussed how to reduce modifiable risk factors and encouraged smoking cessation were applicable. A lengthy discussion took place to review the most common complications including but not limited to: deep vein thrombosis, pulmonary embolus, heart attack, stroke, infection, wound breakdown, numbness, damage to nerves, tendon, muscles, arteries or other blood vessels, death and other possible complications from anesthesia. The patient was told that we will take steps to minimize these risks by using sterile technique, antibiotics and DVT prophylaxis when appropriate and follow the patient postoperatively in the office setting to monitor progress. The possibility of recurrent pain, no improvement in pain and actual worsening of pain were also discussed with the patient.\par The discharge plan of care focused on the patient going home following surgery. The patient was encouraged to make the necessary arrangements to have someone stay with them when they are discharged home. Following discharge, a home care nurse will visit the patient. The home care nurse will open your home care case and request home physical therapy services. Home physical therapy will commence following discharge provided it is appropriate and covered by the health insurance benefit plan. \par The benefits of surgery were discussed with the patient including the potential for improving his current clinical condition through operative intervention. Alternatives to surgical intervention including continued conservative management were also discussed in detail. All questions were answered to the satisfaction of the patient. The treatment plan of care, as well as a model of a total hip arthroplasty equivalent to the one that will be used for their total joint replacement, was shared with the patient. The patient agreed to the plan of care as well as the use of implants in their total joint replacement. \par \par

## 2023-05-22 PROBLEM — E66.9 OBESITY, UNSPECIFIED: Chronic | Status: ACTIVE | Noted: 2022-07-01

## 2023-05-22 PROBLEM — Z86.59 PERSONAL HISTORY OF OTHER MENTAL AND BEHAVIORAL DISORDERS: Chronic | Status: ACTIVE | Noted: 2022-07-01

## 2023-05-22 PROBLEM — F17.200 NICOTINE DEPENDENCE, UNSPECIFIED, UNCOMPLICATED: Chronic | Status: ACTIVE | Noted: 2022-07-01

## 2023-05-22 PROBLEM — Z91.89 OTHER SPECIFIED PERSONAL RISK FACTORS, NOT ELSEWHERE CLASSIFIED: Chronic | Status: ACTIVE | Noted: 2022-07-01

## 2023-05-22 PROBLEM — M16.11 UNILATERAL PRIMARY OSTEOARTHRITIS, RIGHT HIP: Chronic | Status: ACTIVE | Noted: 2022-07-01

## 2023-08-16 ENCOUNTER — OUTPATIENT (OUTPATIENT)
Dept: OUTPATIENT SERVICES | Facility: HOSPITAL | Age: 78
LOS: 1 days | End: 2023-08-16
Payer: MEDICARE

## 2023-08-16 VITALS
OXYGEN SATURATION: 97 % | SYSTOLIC BLOOD PRESSURE: 140 MMHG | HEIGHT: 68 IN | HEART RATE: 60 BPM | TEMPERATURE: 97 F | DIASTOLIC BLOOD PRESSURE: 70 MMHG | WEIGHT: 196.43 LBS | RESPIRATION RATE: 18 BRPM

## 2023-08-16 DIAGNOSIS — Z98.49 CATARACT EXTRACTION STATUS, UNSPECIFIED EYE: Chronic | ICD-10-CM

## 2023-08-16 DIAGNOSIS — F31.9 BIPOLAR DISORDER, UNSPECIFIED: ICD-10-CM

## 2023-08-16 DIAGNOSIS — Z29.9 ENCOUNTER FOR PROPHYLACTIC MEASURES, UNSPECIFIED: ICD-10-CM

## 2023-08-16 DIAGNOSIS — Z96.641 PRESENCE OF RIGHT ARTIFICIAL HIP JOINT: Chronic | ICD-10-CM

## 2023-08-16 DIAGNOSIS — M19.90 UNSPECIFIED OSTEOARTHRITIS, UNSPECIFIED SITE: ICD-10-CM

## 2023-08-16 DIAGNOSIS — J38.1 POLYP OF VOCAL CORD AND LARYNX: Chronic | ICD-10-CM

## 2023-08-16 DIAGNOSIS — Z01.818 ENCOUNTER FOR OTHER PREPROCEDURAL EXAMINATION: ICD-10-CM

## 2023-08-16 LAB
A1C WITH ESTIMATED AVERAGE GLUCOSE RESULT: 5.6 % — SIGNIFICANT CHANGE UP (ref 4–5.6)
ANION GAP SERPL CALC-SCNC: 11 MMOL/L — SIGNIFICANT CHANGE UP (ref 5–17)
APTT BLD: 31.4 SEC — SIGNIFICANT CHANGE UP (ref 24.5–35.6)
BASOPHILS # BLD AUTO: 0.04 K/UL — SIGNIFICANT CHANGE UP (ref 0–0.2)
BASOPHILS NFR BLD AUTO: 0.5 % — SIGNIFICANT CHANGE UP (ref 0–2)
BLD GP AB SCN SERPL QL: SIGNIFICANT CHANGE UP
BUN SERPL-MCNC: 12.6 MG/DL — SIGNIFICANT CHANGE UP (ref 8–20)
CALCIUM SERPL-MCNC: 9 MG/DL — SIGNIFICANT CHANGE UP (ref 8.4–10.5)
CHLORIDE SERPL-SCNC: 105 MMOL/L — SIGNIFICANT CHANGE UP (ref 96–108)
CO2 SERPL-SCNC: 24 MMOL/L — SIGNIFICANT CHANGE UP (ref 22–29)
CREAT SERPL-MCNC: 0.99 MG/DL — SIGNIFICANT CHANGE UP (ref 0.5–1.3)
EGFR: 77 ML/MIN/1.73M2 — SIGNIFICANT CHANGE UP
EOSINOPHIL # BLD AUTO: 0.3 K/UL — SIGNIFICANT CHANGE UP (ref 0–0.5)
EOSINOPHIL NFR BLD AUTO: 3.9 % — SIGNIFICANT CHANGE UP (ref 0–6)
ESTIMATED AVERAGE GLUCOSE: 114 MG/DL — SIGNIFICANT CHANGE UP (ref 68–114)
GLUCOSE SERPL-MCNC: 98 MG/DL — SIGNIFICANT CHANGE UP (ref 70–99)
HCT VFR BLD CALC: 48.2 % — SIGNIFICANT CHANGE UP (ref 39–50)
HGB BLD-MCNC: 15.5 G/DL — SIGNIFICANT CHANGE UP (ref 13–17)
IMM GRANULOCYTES NFR BLD AUTO: 0.5 % — SIGNIFICANT CHANGE UP (ref 0–0.9)
INR BLD: 0.93 RATIO — SIGNIFICANT CHANGE UP (ref 0.85–1.18)
LYMPHOCYTES # BLD AUTO: 2.35 K/UL — SIGNIFICANT CHANGE UP (ref 1–3.3)
LYMPHOCYTES # BLD AUTO: 30.6 % — SIGNIFICANT CHANGE UP (ref 13–44)
MCHC RBC-ENTMCNC: 29.4 PG — SIGNIFICANT CHANGE UP (ref 27–34)
MCHC RBC-ENTMCNC: 32.2 GM/DL — SIGNIFICANT CHANGE UP (ref 32–36)
MCV RBC AUTO: 91.5 FL — SIGNIFICANT CHANGE UP (ref 80–100)
MONOCYTES # BLD AUTO: 0.85 K/UL — SIGNIFICANT CHANGE UP (ref 0–0.9)
MONOCYTES NFR BLD AUTO: 11.1 % — SIGNIFICANT CHANGE UP (ref 2–14)
MRSA PCR RESULT.: SIGNIFICANT CHANGE UP
NEUTROPHILS # BLD AUTO: 4.09 K/UL — SIGNIFICANT CHANGE UP (ref 1.8–7.4)
NEUTROPHILS NFR BLD AUTO: 53.4 % — SIGNIFICANT CHANGE UP (ref 43–77)
PLATELET # BLD AUTO: 176 K/UL — SIGNIFICANT CHANGE UP (ref 150–400)
POTASSIUM SERPL-MCNC: 4.2 MMOL/L — SIGNIFICANT CHANGE UP (ref 3.5–5.3)
POTASSIUM SERPL-SCNC: 4.2 MMOL/L — SIGNIFICANT CHANGE UP (ref 3.5–5.3)
PROTHROM AB SERPL-ACNC: 10.3 SEC — SIGNIFICANT CHANGE UP (ref 9.5–13)
RBC # BLD: 5.27 M/UL — SIGNIFICANT CHANGE UP (ref 4.2–5.8)
RBC # FLD: 13.4 % — SIGNIFICANT CHANGE UP (ref 10.3–14.5)
S AUREUS DNA NOSE QL NAA+PROBE: SIGNIFICANT CHANGE UP
SODIUM SERPL-SCNC: 140 MMOL/L — SIGNIFICANT CHANGE UP (ref 135–145)
WBC # BLD: 7.67 K/UL — SIGNIFICANT CHANGE UP (ref 3.8–10.5)
WBC # FLD AUTO: 7.67 K/UL — SIGNIFICANT CHANGE UP (ref 3.8–10.5)

## 2023-08-16 PROCEDURE — 93005 ELECTROCARDIOGRAM TRACING: CPT

## 2023-08-16 PROCEDURE — G0463: CPT

## 2023-08-16 PROCEDURE — 93010 ELECTROCARDIOGRAM REPORT: CPT

## 2023-08-16 PROCEDURE — 71046 X-RAY EXAM CHEST 2 VIEWS: CPT | Mod: 26

## 2023-08-16 PROCEDURE — 71046 X-RAY EXAM CHEST 2 VIEWS: CPT

## 2023-08-16 RX ORDER — RISPERIDONE 4 MG/1
1 TABLET ORAL
Qty: 0 | Refills: 0 | DISCHARGE

## 2023-08-16 RX ORDER — SODIUM CHLORIDE 9 MG/ML
3 INJECTION INTRAMUSCULAR; INTRAVENOUS; SUBCUTANEOUS EVERY 8 HOURS
Refills: 0 | Status: DISCONTINUED | OUTPATIENT
Start: 2023-09-05 | End: 2023-09-05

## 2023-08-16 NOTE — H&P PST ADULT - HISTORY OF PRESENT ILLNESS
78 yr old male with history of chronic back pain , OA , BIPOLAR , and high cholesterol presents for left hip replacement.  Pt had right hip replaced in JUly 2022 with DR. Chacon tolerated well with relief , now c/o left hip and groin pain mostly with activity, ambulates without assistance but is limited due to discomfort , 0/10 pain today . Pt has lower back pain that radiates to left buttock , had RAJNI years ago with pain management , currently treats with PCP  takes  Hydrocodone as needed with mild relief. Pt is scheduled for left hip replacement on 9/5/23 with Dr. Chacon.

## 2023-08-16 NOTE — H&P PST ADULT - ASSESSMENT
78 yr old male with history of chronic back pain , OA , BIPOLAR , and high cholesterol presents for left hip replacement.  Pt had right hip replaced in 2022 with DR. Chacon tolerated well with relief , now c/o left hip and groin pain mostly with activity, ambulates without assistance but is limited due to discomfort , 0/10 pain today . Pt has lower back pain that radiates to left buttock , had RAJNI years ago with pain management , currently treats with PCP  takes  Hydrocodone as needed with mild relief. Pt is scheduled for left hip replacement on 23 with Dr. Chacon. medical clearance with DR. Campos to be obtained.   Patient was given information on joint class, joint book provided, ERP protocol reviewed with patient, MSSA/MRSA swabbed in PST, results pending  OPIOID RISK TOOL    LISBETH EACH BOX THAT APPLIES AND ADD TOTALS AT THE END    FAMILY HISTORY OF SUBSTANCE ABUSE                 FEMALE         MALE                                                Alcohol                             [  ]1 pt          [  ]3pts                                               Illegal Durgs                     [  ]2 pts        [  ]3pts                                               Rx Drugs                           [  ]4 pts        [  ]4 pts    PERSONAL HISTORY OF SUBSTANCE ABUSE                                                                                          Alcohol                             [  ]3 pts       [  ]3 pts                                               Illegal Durgs                     [  ]4 pts        [  ]4 pts                                               Rx Drugs                           [  ]5 pts        [  ]5 pts    AGE BETWEEN 16-45 YEARS                                      [  ]1 pt         [  ]1 pt    HISTORY OF PREADOLESCENT   SEXUAL ABUSE                                                             [  ]3 pts        [  ]0pts    PSYCHOLOGICAL DISEASE                     ADD, OCD, Bipolar, Schizophrenia        [  ]2 pts         [ X ]2 pts                      Depression                                               [  ]1 pt           [  ]1 pt           SCORING TOTAL   (add numbers and type here)              (*2**)                                     A score of 3 or lower indicated LOW risk for future opiod abuse  A score of 4 to 7 indicated moderate risk for future opiod abuse  A score of 8 or higher indicates a high risk for opiod abuse  CAPRINI VTE 2.0 SCORE [CLOT updated 2019]    AGE RELATED RISK FACTORS                                                       MOBILITY RELATED FACTORS  [ ] Age 41-60 years                                            (1 Point)                    [ ] Bed rest                                                        (1 Point)  [ ] Age: 61-74 years                                           (2 Points)                  [ ] Plaster cast                                                   (2 Points)  [ X] Age= 75 years                                              (3 Points)                    [ ] Bed bound for more than 72 hours                 (2 Points)    DISEASE RELATED RISK FACTORS                                               GENDER SPECIFIC FACTORS  [ ] Edema in the lower extremities                       (1 Point)              [ ] Pregnancy                                                     (1 Point)  [ ] Varicose veins                                               (1 Point)                     [ ] Post-partum < 6 weeks                                   (1 Point)             [x ] BMI > 25 Kg/m2                                            (1 Point)                     [ ] Hormonal therapy  or oral contraception          (1 Point)                 [ ] Sepsis (in the previous month)                        (1 Point)               [ ] History of pregnancy complications                 (1 point)  [ ] Pneumonia or serious lung disease                                               [ ] Unexplained or recurrent                     (1 Point)           (in the previous month)                               (1 Point)  [ ] Abnormal pulmonary function test                     (1 Point)                 SURGERY RELATED RISK FACTORS  [ ] Acute myocardial infarction                              (1 Point)               [ ]  Section                                             (1 Point)  [ ] Congestive heart failure (in the previous month)  (1 Point)      [ ] Minor surgery                                                  (1 Point)   [ ] Inflammatory bowel disease                             (1 Point)               [ ] Arthroscopic surgery                                        (2 Points)  [ ] Central venous access                                      (2 Points)                [ ] General surgery lasting more than 45 minutes (2 points)  [ ] Malignancy- Present or previous                   (2 Points)                [X ] Elective arthroplasty                                         (5 points)    [ ] Stroke (in the previous month)                          (5 Points)                                                                                                                                                           HEMATOLOGY RELATED FACTORS                                                 TRAUMA RELATED RISK FACTORS  [ ] Prior episodes of VTE                                     (3 Points)                [ ] Fracture of the hip, pelvis, or leg                       (5 Points)  [ ] Positive family history for VTE                         (3 Points)             [ ] Acute spinal cord injury (in the previous month)  (5 Points)  [ ] Prothrombin 38354 A                                     (3 Points)               [ ] Paralysis  (less than 1 month)                             (5 Points)  [ ] Factor V Leiden                                             (3 Points)                  [ ] Multiple Trauma within 1 month                        (5 Points)  [ ] Lupus anticoagulants                                     (3 Points)                                                           [ ] Anticardiolipin antibodies                               (3 Points)                                                       [ ] High homocysteine in the blood                      (3 Points)                                             [ ] Other congenital or acquired thrombophilia      (3 Points)                                                [ ] Heparin induced thrombocytopenia                  (3 Points)                                     Total Score [   9       ]

## 2023-08-16 NOTE — H&P PST ADULT - NSICDXFAMILYHX_GEN_ALL_CORE_FT
FAMILY HISTORY:  Father  Still living? Unknown  FH: diabetes mellitus, Age at diagnosis: Age Unknown    Mother  Still living? No  Family history of osteoarthritis, Age at diagnosis: Age Unknown

## 2023-08-16 NOTE — H&P PST ADULT - PROBLEM SELECTOR PLAN 2
left hip total joint replacement  with DR. Chacon   medical clearance pending  hold asa/ Nsaids / krill oil pre op x 1 week

## 2023-08-16 NOTE — H&P PST ADULT - NSICDXPASTSURGICALHX_GEN_ALL_CORE_FT
PAST SURGICAL HISTORY:  History of cataract surgery     History of right hip replacement     Vocal cord polyps

## 2023-08-16 NOTE — H&P PST ADULT - NSICDXPASTMEDICALHX_GEN_ALL_CORE_FT
PAST MEDICAL HISTORY:  At risk for sleep apnea     Current smoker     Gout     History of bipolar disorder     Hyperlipidemia     Hypertension     OA (osteoarthritis)     Obese BMI 31    Unilateral primary osteoarthritis, right hip

## 2023-08-16 NOTE — H&P PST ADULT - MUSCULOSKELETAL COMMENTS
c/o left buttock discomfort with left leg raise left buttock and lower back pain , left hip and groin pain with walking

## 2023-08-30 RX ORDER — TRANEXAMIC ACID 100 MG/ML
1000 INJECTION, SOLUTION INTRAVENOUS ONCE
Refills: 0 | Status: DISCONTINUED | OUTPATIENT
Start: 2023-09-05 | End: 2023-09-05

## 2023-09-04 ENCOUNTER — TRANSCRIPTION ENCOUNTER (OUTPATIENT)
Age: 78
End: 2023-09-04

## 2023-09-05 ENCOUNTER — INPATIENT (INPATIENT)
Facility: HOSPITAL | Age: 78
LOS: 0 days | Discharge: HOME HEALTH | DRG: 470 | End: 2023-09-06
Attending: ORTHOPAEDIC SURGERY | Admitting: ORTHOPAEDIC SURGERY
Payer: MEDICARE

## 2023-09-05 ENCOUNTER — APPOINTMENT (OUTPATIENT)
Dept: ORTHOPEDIC SURGERY | Facility: HOSPITAL | Age: 78
End: 2023-09-05

## 2023-09-05 VITALS
HEART RATE: 70 BPM | DIASTOLIC BLOOD PRESSURE: 60 MMHG | WEIGHT: 196.43 LBS | OXYGEN SATURATION: 94 % | HEIGHT: 68 IN | TEMPERATURE: 98 F | SYSTOLIC BLOOD PRESSURE: 117 MMHG | RESPIRATION RATE: 20 BRPM

## 2023-09-05 DIAGNOSIS — M16.12 UNILATERAL PRIMARY OSTEOARTHRITIS, LEFT HIP: ICD-10-CM

## 2023-09-05 DIAGNOSIS — Z98.49 CATARACT EXTRACTION STATUS, UNSPECIFIED EYE: Chronic | ICD-10-CM

## 2023-09-05 DIAGNOSIS — J38.1 POLYP OF VOCAL CORD AND LARYNX: Chronic | ICD-10-CM

## 2023-09-05 DIAGNOSIS — Z96.641 PRESENCE OF RIGHT ARTIFICIAL HIP JOINT: Chronic | ICD-10-CM

## 2023-09-05 LAB — BLD GP AB SCN SERPL QL: SIGNIFICANT CHANGE UP

## 2023-09-05 PROCEDURE — 27130 TOTAL HIP ARTHROPLASTY: CPT | Mod: LT

## 2023-09-05 PROCEDURE — 27130 TOTAL HIP ARTHROPLASTY: CPT | Mod: AS,LT

## 2023-09-05 PROCEDURE — 73502 X-RAY EXAM HIP UNI 2-3 VIEWS: CPT | Mod: 26,LT

## 2023-09-05 DEVICE — HEAD CERAMIC BIOLOX 36MM DELTA OPTION: Type: IMPLANTABLE DEVICE | Site: LEFT | Status: FUNCTIONAL

## 2023-09-05 DEVICE — IMPLANTABLE DEVICE: Type: IMPLANTABLE DEVICE | Site: LEFT | Status: FUNCTIONAL

## 2023-09-05 DEVICE — SHELL ACET 54MM TRABECULAR METAL: Type: IMPLANTABLE DEVICE | Site: LEFT | Status: FUNCTIONAL

## 2023-09-05 DEVICE — SHELL ACET CONTIN TRAB METAL II HEMISPHR C HOLE 52MM: Type: IMPLANTABLE DEVICE | Site: LEFT | Status: FUNCTIONAL

## 2023-09-05 DEVICE — SCREW BONE SLF TAP 6.5X40MM: Type: IMPLANTABLE DEVICE | Site: LEFT | Status: FUNCTIONAL

## 2023-09-05 DEVICE — SCREW SELF TAP 6.5X30MM: Type: IMPLANTABLE DEVICE | Site: LEFT | Status: FUNCTIONAL

## 2023-09-05 RX ORDER — ATORVASTATIN CALCIUM 80 MG/1
1 TABLET, FILM COATED ORAL
Refills: 0 | DISCHARGE

## 2023-09-05 RX ORDER — CELECOXIB 200 MG/1
200 CAPSULE ORAL EVERY 12 HOURS
Refills: 0 | Status: DISCONTINUED | OUTPATIENT
Start: 2023-09-06 | End: 2023-09-06

## 2023-09-05 RX ORDER — HYDROMORPHONE HYDROCHLORIDE 2 MG/ML
4 INJECTION INTRAMUSCULAR; INTRAVENOUS; SUBCUTANEOUS
Refills: 0 | Status: DISCONTINUED | OUTPATIENT
Start: 2023-09-05 | End: 2023-09-06

## 2023-09-05 RX ORDER — ASPIRIN/CALCIUM CARB/MAGNESIUM 324 MG
81 TABLET ORAL
Refills: 0 | Status: DISCONTINUED | OUTPATIENT
Start: 2023-09-05 | End: 2023-09-06

## 2023-09-05 RX ORDER — APREPITANT 80 MG/1
40 CAPSULE ORAL ONCE
Refills: 0 | Status: COMPLETED | OUTPATIENT
Start: 2023-09-05 | End: 2023-09-05

## 2023-09-05 RX ORDER — SODIUM CHLORIDE 9 MG/ML
1000 INJECTION, SOLUTION INTRAVENOUS
Refills: 0 | Status: DISCONTINUED | OUTPATIENT
Start: 2023-09-05 | End: 2023-09-05

## 2023-09-05 RX ORDER — PANTOPRAZOLE SODIUM 20 MG/1
40 TABLET, DELAYED RELEASE ORAL
Refills: 0 | Status: DISCONTINUED | OUTPATIENT
Start: 2023-09-05 | End: 2023-09-06

## 2023-09-05 RX ORDER — ACETAMINOPHEN 500 MG
975 TABLET ORAL EVERY 8 HOURS
Refills: 0 | Status: DISCONTINUED | OUTPATIENT
Start: 2023-09-06 | End: 2023-09-06

## 2023-09-05 RX ORDER — CEFAZOLIN SODIUM 1 G
2000 VIAL (EA) INJECTION ONCE
Refills: 0 | Status: DISCONTINUED | OUTPATIENT
Start: 2023-09-05 | End: 2023-09-05

## 2023-09-05 RX ORDER — ONDANSETRON 8 MG/1
4 TABLET, FILM COATED ORAL EVERY 6 HOURS
Refills: 0 | Status: DISCONTINUED | OUTPATIENT
Start: 2023-09-05 | End: 2023-09-06

## 2023-09-05 RX ORDER — POLYETHYLENE GLYCOL 3350 17 G/17G
17 POWDER, FOR SOLUTION ORAL AT BEDTIME
Refills: 0 | Status: DISCONTINUED | OUTPATIENT
Start: 2023-09-05 | End: 2023-09-06

## 2023-09-05 RX ORDER — CELECOXIB 200 MG/1
400 CAPSULE ORAL ONCE
Refills: 0 | Status: COMPLETED | OUTPATIENT
Start: 2023-09-05 | End: 2023-09-05

## 2023-09-05 RX ORDER — FENTANYL CITRATE 50 UG/ML
25 INJECTION INTRAVENOUS
Refills: 0 | Status: DISCONTINUED | OUTPATIENT
Start: 2023-09-05 | End: 2023-09-05

## 2023-09-05 RX ORDER — ATORVASTATIN CALCIUM 80 MG/1
10 TABLET, FILM COATED ORAL AT BEDTIME
Refills: 0 | Status: DISCONTINUED | OUTPATIENT
Start: 2023-09-05 | End: 2023-09-06

## 2023-09-05 RX ORDER — ACETAMINOPHEN 500 MG
1000 TABLET ORAL
Refills: 0 | Status: COMPLETED | OUTPATIENT
Start: 2023-09-05 | End: 2023-09-05

## 2023-09-05 RX ORDER — ACETAMINOPHEN 500 MG
975 TABLET ORAL ONCE
Refills: 0 | Status: COMPLETED | OUTPATIENT
Start: 2023-09-05 | End: 2023-09-05

## 2023-09-05 RX ORDER — TRAZODONE HCL 50 MG
1 TABLET ORAL
Qty: 0 | Refills: 0 | DISCHARGE

## 2023-09-05 RX ORDER — ONDANSETRON 8 MG/1
4 TABLET, FILM COATED ORAL ONCE
Refills: 0 | Status: DISCONTINUED | OUTPATIENT
Start: 2023-09-05 | End: 2023-09-05

## 2023-09-05 RX ORDER — RISPERIDONE 4 MG/1
1.5 TABLET ORAL DAILY
Refills: 0 | Status: DISCONTINUED | OUTPATIENT
Start: 2023-09-05 | End: 2023-09-06

## 2023-09-05 RX ORDER — TRAZODONE HCL 50 MG
150 TABLET ORAL DAILY
Refills: 0 | Status: DISCONTINUED | OUTPATIENT
Start: 2023-09-05 | End: 2023-09-06

## 2023-09-05 RX ORDER — OXYCODONE HYDROCHLORIDE 5 MG/1
10 TABLET ORAL
Refills: 0 | Status: DISCONTINUED | OUTPATIENT
Start: 2023-09-05 | End: 2023-09-06

## 2023-09-05 RX ORDER — CEFAZOLIN SODIUM 1 G
2000 VIAL (EA) INJECTION
Refills: 0 | Status: DISCONTINUED | OUTPATIENT
Start: 2023-09-05 | End: 2023-09-05

## 2023-09-05 RX ORDER — OXYCODONE HYDROCHLORIDE 5 MG/1
5 TABLET ORAL
Refills: 0 | Status: DISCONTINUED | OUTPATIENT
Start: 2023-09-05 | End: 2023-09-06

## 2023-09-05 RX ORDER — CEFAZOLIN SODIUM 1 G
2000 VIAL (EA) INJECTION
Refills: 0 | Status: COMPLETED | OUTPATIENT
Start: 2023-09-05 | End: 2023-09-06

## 2023-09-05 RX ORDER — KETOROLAC TROMETHAMINE 30 MG/ML
15 SYRINGE (ML) INJECTION EVERY 6 HOURS
Refills: 0 | Status: COMPLETED | OUTPATIENT
Start: 2023-09-05 | End: 2023-09-06

## 2023-09-05 RX ORDER — SENNA PLUS 8.6 MG/1
2 TABLET ORAL AT BEDTIME
Refills: 0 | Status: DISCONTINUED | OUTPATIENT
Start: 2023-09-05 | End: 2023-09-06

## 2023-09-05 RX ORDER — ATORVASTATIN CALCIUM 80 MG/1
1 TABLET, FILM COATED ORAL
Qty: 0 | Refills: 0 | DISCHARGE

## 2023-09-05 RX ORDER — RISPERIDONE 4 MG/1
3 TABLET ORAL
Refills: 0 | DISCHARGE

## 2023-09-05 RX ORDER — MAGNESIUM HYDROXIDE 400 MG/1
30 TABLET, CHEWABLE ORAL DAILY
Refills: 0 | Status: DISCONTINUED | OUTPATIENT
Start: 2023-09-05 | End: 2023-09-06

## 2023-09-05 RX ORDER — SODIUM CHLORIDE 9 MG/ML
1000 INJECTION INTRAMUSCULAR; INTRAVENOUS; SUBCUTANEOUS
Refills: 0 | Status: DISCONTINUED | OUTPATIENT
Start: 2023-09-05 | End: 2023-09-06

## 2023-09-05 RX ADMIN — Medication 400 MILLIGRAM(S): at 21:51

## 2023-09-05 RX ADMIN — APREPITANT 40 MILLIGRAM(S): 80 CAPSULE ORAL at 12:00

## 2023-09-05 RX ADMIN — Medication 15 MILLIGRAM(S): at 20:07

## 2023-09-05 RX ADMIN — SENNA PLUS 2 TABLET(S): 8.6 TABLET ORAL at 21:43

## 2023-09-05 RX ADMIN — Medication 81 MILLIGRAM(S): at 21:42

## 2023-09-05 RX ADMIN — SODIUM CHLORIDE 80 MILLILITER(S): 9 INJECTION INTRAMUSCULAR; INTRAVENOUS; SUBCUTANEOUS at 21:42

## 2023-09-05 RX ADMIN — Medication 975 MILLIGRAM(S): at 12:00

## 2023-09-05 RX ADMIN — CELECOXIB 400 MILLIGRAM(S): 200 CAPSULE ORAL at 12:00

## 2023-09-05 RX ADMIN — ATORVASTATIN CALCIUM 10 MILLIGRAM(S): 80 TABLET, FILM COATED ORAL at 21:43

## 2023-09-05 RX ADMIN — Medication 2000 MILLIGRAM(S): at 21:43

## 2023-09-05 NOTE — PATIENT PROFILE ADULT - FALL HARM RISK - HARM RISK INTERVENTIONS
Assistance with ambulation/Assistance OOB with selected safe patient handling equipment/Communicate Risk of Fall with Harm to all staff/Discuss with provider need for PT consult/Monitor gait and stability/Provide patient with walking aids - walker, cane, crutches/Reinforce activity limits and safety measures with patient and family/Sit up slowly, dangle for a short time, stand at bedside before walking/Tailored Fall Risk Interventions/Use of alarms - bed, chair and/or voice tab/Visual Cue: Yellow wristband and red socks/Bed in lowest position, wheels locked, appropriate side rails in place/Call bell, personal items and telephone in reach/Instruct patient to call for assistance before getting out of bed or chair/Non-slip footwear when patient is out of bed/[employer] to call system/Physically safe environment - no spills, clutter or unnecessary equipment/Purposeful Proactive Rounding/Room/bathroom lighting operational, light cord in reach

## 2023-09-05 NOTE — PHYSICAL THERAPY INITIAL EVALUATION ADULT - PERSONAL SAFETY AND JUDGMENT, REHAB EVAL
Lov- 7/25/2022    f - 5/26/2023  
No care due was identified.  [facility] Embedded Care Due Messages. Reference number: [id number].   9/04/2023 5:51:44 PM CDT  
intact

## 2023-09-05 NOTE — PATIENT PROFILE ADULT - PACKS YRS CALCULATION
Call from pt with blood pressure readings all taken 6:30am  She is feeling jittery and dizzy at times  7/26 160/79 p-70  7/27 141/67 p-68  7/28 158/75 p-70  7/29 156/83 p-65  7/30 161/84 p-74  7/31 155/73 p-74  8/1 171/82 p-62  Please Advise
Needs another agent - amlodipine 5 mg daily if she is ok with this - thanks      Juan Mortensen
66

## 2023-09-06 ENCOUNTER — TRANSCRIPTION ENCOUNTER (OUTPATIENT)
Age: 78
End: 2023-09-06

## 2023-09-06 VITALS
OXYGEN SATURATION: 92 % | SYSTOLIC BLOOD PRESSURE: 114 MMHG | RESPIRATION RATE: 19 BRPM | HEART RATE: 72 BPM | DIASTOLIC BLOOD PRESSURE: 71 MMHG | TEMPERATURE: 98 F

## 2023-09-06 LAB
ANION GAP SERPL CALC-SCNC: 10 MMOL/L — SIGNIFICANT CHANGE UP (ref 5–17)
BUN SERPL-MCNC: 16.2 MG/DL — SIGNIFICANT CHANGE UP (ref 8–20)
CALCIUM SERPL-MCNC: 8.1 MG/DL — LOW (ref 8.4–10.5)
CHLORIDE SERPL-SCNC: 105 MMOL/L — SIGNIFICANT CHANGE UP (ref 96–108)
CO2 SERPL-SCNC: 25 MMOL/L — SIGNIFICANT CHANGE UP (ref 22–29)
CREAT SERPL-MCNC: 1.04 MG/DL — SIGNIFICANT CHANGE UP (ref 0.5–1.3)
EGFR: 73 ML/MIN/1.73M2 — SIGNIFICANT CHANGE UP
GLUCOSE SERPL-MCNC: 113 MG/DL — HIGH (ref 70–99)
HCT VFR BLD CALC: 35.4 % — LOW (ref 39–50)
HGB BLD-MCNC: 11.7 G/DL — LOW (ref 13–17)
MCHC RBC-ENTMCNC: 29.7 PG — SIGNIFICANT CHANGE UP (ref 27–34)
MCHC RBC-ENTMCNC: 33.1 GM/DL — SIGNIFICANT CHANGE UP (ref 32–36)
MCV RBC AUTO: 89.8 FL — SIGNIFICANT CHANGE UP (ref 80–100)
PLATELET # BLD AUTO: 153 K/UL — SIGNIFICANT CHANGE UP (ref 150–400)
POTASSIUM SERPL-MCNC: 3.9 MMOL/L — SIGNIFICANT CHANGE UP (ref 3.5–5.3)
POTASSIUM SERPL-SCNC: 3.9 MMOL/L — SIGNIFICANT CHANGE UP (ref 3.5–5.3)
RBC # BLD: 3.94 M/UL — LOW (ref 4.2–5.8)
RBC # FLD: 13.4 % — SIGNIFICANT CHANGE UP (ref 10.3–14.5)
SODIUM SERPL-SCNC: 140 MMOL/L — SIGNIFICANT CHANGE UP (ref 135–145)
WBC # BLD: 9.18 K/UL — SIGNIFICANT CHANGE UP (ref 3.8–10.5)
WBC # FLD AUTO: 9.18 K/UL — SIGNIFICANT CHANGE UP (ref 3.8–10.5)

## 2023-09-06 PROCEDURE — C1776: CPT

## 2023-09-06 PROCEDURE — 86850 RBC ANTIBODY SCREEN: CPT

## 2023-09-06 PROCEDURE — 86900 BLOOD TYPING SEROLOGIC ABO: CPT

## 2023-09-06 PROCEDURE — 73502 X-RAY EXAM HIP UNI 2-3 VIEWS: CPT

## 2023-09-06 PROCEDURE — C1713: CPT

## 2023-09-06 PROCEDURE — 85027 COMPLETE CBC AUTOMATED: CPT

## 2023-09-06 PROCEDURE — 99222 1ST HOSP IP/OBS MODERATE 55: CPT

## 2023-09-06 PROCEDURE — 36415 COLL VENOUS BLD VENIPUNCTURE: CPT

## 2023-09-06 PROCEDURE — 86901 BLOOD TYPING SEROLOGIC RH(D): CPT

## 2023-09-06 PROCEDURE — 80048 BASIC METABOLIC PNL TOTAL CA: CPT

## 2023-09-06 RX ORDER — NICOTINE POLACRILEX 2 MG
1 GUM BUCCAL DAILY
Refills: 0 | Status: DISCONTINUED | OUTPATIENT
Start: 2023-09-06 | End: 2023-09-06

## 2023-09-06 RX ORDER — OXYCODONE AND ACETAMINOPHEN 5; 325 MG/1; MG/1
1 TABLET ORAL
Qty: 30 | Refills: 0
Start: 2023-09-06

## 2023-09-06 RX ORDER — CELECOXIB 200 MG/1
1 CAPSULE ORAL
Qty: 42 | Refills: 0
Start: 2023-09-06 | End: 2023-09-26

## 2023-09-06 RX ORDER — ASPIRIN/CALCIUM CARB/MAGNESIUM 324 MG
1 TABLET ORAL
Qty: 84 | Refills: 0
Start: 2023-09-06 | End: 2023-10-17

## 2023-09-06 RX ORDER — HYDROCODONE BITARTRATE AND ACETAMINOPHEN 7.5; 325 MG/15ML; MG/15ML
1 SOLUTION ORAL
Refills: 0 | DISCHARGE

## 2023-09-06 RX ORDER — OMEPRAZOLE 10 MG/1
1 CAPSULE, DELAYED RELEASE ORAL
Qty: 42 | Refills: 0
Start: 2023-09-06 | End: 2023-10-17

## 2023-09-06 RX ORDER — ERGOCALCIFEROL 1.25 MG/1
0 CAPSULE ORAL
Qty: 0 | Refills: 0 | DISCHARGE

## 2023-09-06 RX ORDER — OMEGA-3 ACID ETHYL ESTERS 1 G
0 CAPSULE ORAL
Refills: 0 | DISCHARGE

## 2023-09-06 RX ORDER — IBUPROFEN 200 MG
1 TABLET ORAL
Refills: 0 | DISCHARGE

## 2023-09-06 RX ADMIN — Medication 975 MILLIGRAM(S): at 05:58

## 2023-09-06 RX ADMIN — Medication 81 MILLIGRAM(S): at 05:58

## 2023-09-06 RX ADMIN — Medication 15 MILLIGRAM(S): at 01:08

## 2023-09-06 RX ADMIN — Medication 150 MILLIGRAM(S): at 01:07

## 2023-09-06 RX ADMIN — PANTOPRAZOLE SODIUM 40 MILLIGRAM(S): 20 TABLET, DELAYED RELEASE ORAL at 05:58

## 2023-09-06 RX ADMIN — Medication 2000 MILLIGRAM(S): at 05:59

## 2023-09-06 NOTE — DISCHARGE NOTE PROVIDER - NSDCFUSCHEDAPPT_GEN_ALL_CORE_FT
Alphonse Chacon  [facility]  ORTHOSURG [address]  Scheduled Appointment: 09/25/2023    Elebiary, Yeon J  [facility]  ORTHOR [address]  Scheduled Appointment: 10/27/2023    [facility] [address]  Scheduled Appointment: 11/27/2023

## 2023-09-06 NOTE — PHYSICAL THERAPY INITIAL EVALUATION ADULT - ADDITIONAL COMMENTS
Pt lives in a house with  0 steps to enter and 13 stairs inside with  rails.  Pt owns medical equipment: SAC, RW, Sarah, SC  Pt lives with: Spouse  Someone is always available to provide assist.

## 2023-09-06 NOTE — DISCHARGE NOTE PROVIDER - NPI NUMBER (FOR SYSADMIN USE ONLY) :
Contacted Cover My Meds and archived the PA.    Xultophy is not covered by patient's insurance and was changed to [employer] per 10/16/19 telephone encounter.   [[id number]]

## 2023-09-06 NOTE — DISCHARGE NOTE PROVIDER - NSDCMRMEDTOKEN_GEN_ALL_CORE_FT
Advil 200 mg oral tablet: 1 orally as needed for  moderate pain  atorvastatin 10 mg oral tablet: 1 tab(s) orally once a day  Fish Oil oral capsule: orally once a day krill oil  hydrocodone-acetaminophen 10 mg-325 mg oral tablet: 1 orally as needed for  moderate pain  krill oil: orally once a day  risperiDONE 0.5 mg oral tablet: 3 tab(s) orally once a day  trazodone 150 mg tablet: 1 tab(s) orally once a day  Vitamin D2: orally once a day

## 2023-09-06 NOTE — CONSULT NOTE ADULT - SUBJECTIVE AND OBJECTIVE BOX
Patient is a 78y old  Male who is s/p L ALYCE , POD#1. Doing well , pain well controlled , denies n/v , voiding , tolerating diet ,   participating with physical therapy .     CC: L hip chronic pain , postop pain well controlled       HPI:  78 yr old male with history of chronic back pain , OA , BIPOLAR , Gout , current smoker - not willing to quit , high cholesterol , chronic L hip pain .   Pt had right hip replaced in JUly 2022 with DR. Chacon tolerated well with relief , now c/o left hip and groin pain mostly with activity, ambulates without assistance but is limited due to discomfort , 0/10 pain today . Pt has lower back pain that radiates to left buttock , had RAJNI years ago with pain management , currently treats with PCP  takes  Hydrocodone as needed with mild relief.       PAST MEDICAL & SURGICAL HISTORY:  Hyperlipidemia      Gout    Current smoker      Unilateral primary osteoarthritis, right hip      History of bipolar disorder      At risk for sleep apnea      Obese      OA (osteoarthritis)      History of cataract surgery      Vocal cord polyps      History of right hip replacement          Social History:  Tobacco - 1 ppd x 60yrs , not willing to quit   ETOH - 1-2 drinks 2-3 times/wkly   Illicit drug abuse - denies  Smokes Marijuana socially - not willing to stop     FAMILY HISTORY:  FH: diabetes mellitus (Father)    Family history of osteoarthritis (Mother)        Allergies    No Known Allergies    Intolerances        HOME MEDICATIONS :     atorvastatin 10 mg oral tablet: 1 tab(s) orally once a day (05 Sep 2023 11:51)  risperiDONE 0.5 mg oral tablet: 3 tab(s) orally once a day (05 Sep 2023 11:51)  trazodone 150 mg tablet: 1 tab(s) orally once a day (05 Sep 2023 11:51)      REVIEW OF SYSTEMS:   L hip chronic pain , all other systems are reviewed and are negative .     MEDICATIONS  (STANDING):  acetaminophen     Tablet .. 975 milliGRAM(s) Oral every 8 hours  aspirin 81 milliGRAM(s) Oral two times a day  atorvastatin 10 milliGRAM(s) Oral at bedtime  celecoxib 200 milliGRAM(s) Oral every 12 hours  ketorolac   Injectable 15 milliGRAM(s) IV Push every 6 hours  nicotine -  14 mG/24Hr(s) Patch 1 Patch Transdermal daily  pantoprazole    Tablet 40 milliGRAM(s) Oral before breakfast  polyethylene glycol 3350 17 Gram(s) Oral at bedtime  risperiDONE   Tablet 1.5 milliGRAM(s) Oral daily  senna 2 Tablet(s) Oral at bedtime  sodium chloride 0.9%. 1000 milliLiter(s) (80 mL/Hr) IV Continuous <Continuous>  traZODone 150 milliGRAM(s) Oral daily    MEDICATIONS  (PRN):  aluminum hydroxide/magnesium hydroxide/simethicone Suspension 30 milliLiter(s) Oral four times a day PRN Indigestion  HYDROmorphone   Tablet 4 milliGRAM(s) Oral every 3 hours PRN Severe Pain (7 - 10)  magnesium hydroxide Suspension 30 milliLiter(s) Oral daily PRN Constipation  ondansetron Injectable 4 milliGRAM(s) IV Push every 6 hours PRN Nausea and/or Vomiting  oxyCODONE    IR 5 milliGRAM(s) Oral every 3 hours PRN Mild Pain (1 - 3)  oxyCODONE    IR 10 milliGRAM(s) Oral every 3 hours PRN Moderate Pain (4 - 6)      Vital Signs Last 24 Hrs  T(C): 36.7 (06 Sep 2023 09:57), Max: 37.2 (06 Sep 2023 01:09)  T(F): 98.1 (06 Sep 2023 09:57), Max: 99 (06 Sep 2023 01:09)  HR: 72 (06 Sep 2023 09:57) (44 - 78)  BP: 114/71 (06 Sep 2023 09:57) (95/55 - 149/77)  BP(mean): 103 (05 Sep 2023 19:45) (66 - 103)  RR: 19 (06 Sep 2023 09:57) (11 - 20)  SpO2: 92% (06 Sep 2023 09:57) (92% - 99%)    Parameters below as of 06 Sep 2023 09:57  Patient On (Oxygen Delivery Method): room air        PHYSICAL EXAM:    GENERAL: NAD, well-groomed, well-developed  HEAD:  Atraumatic, Normocephalic  EYES: EOMI, PERRLA, conjunctiva and sclera clear  NECK: Supple, No JVD, Normal thyroid  NERVOUS SYSTEM:  Alert & Oriented X 4, no focal deficit   CHEST/LUNG: CTA  b/l,  no rales, rhonchi, wheezing, or rubs  HEART: Regular rate and rhythm; No murmurs, rubs, or gallops  ABDOMEN: Soft, Nontender, Nondistended; Bowel sounds present  EXTREMITIES:  2+ Peripheral Pulses, No clubbing, cyanosis, or edema ,   L hip dressing + , clean and dry   LYMPH: No lymphadenopathy noted  SKIN: No rashes or lesions    LABS:                        11.7   9.18  )-----------( 153      ( 06 Sep 2023 05:35 )             35.4     09-06    140  |  105  |  16.2  ----------------------------<  113<H>  3.9   |  25.0  |  1.04    Ca    8.1<L>      06 Sep 2023 05:35        RADIOLOGY & ADDITIONAL STUDIES:

## 2023-09-06 NOTE — DISCHARGE NOTE PROVIDER - NSDCFUADDINST_GEN_ALL_CORE_FT
The patient will be seen in the office between 2-3 weeks for wound check.   **Your first post-operative visit has been scheduled prior to your admission. PLEASE CONTACT OFFICE TO CONFIRM THE APPOINTMENT DATE. Sutures/Staples/Tape will be removed at that time.  **  The silver based dressing is to be removed 7 days from the date of surgery.   ** CONTACT THE OFFICE IF THE FOLLOWING DEVELOP:  - the dressing becomes soiled or saturated  - you develop a fever greater that 101F  - the wound becomes red or you develop blistering around the wound  * Patient may shower after post-op day #3.   * The patient will continue home PT consistent with posterior total hip replacement protocol and will continue to practice posterior total hip precautions for a minimum of 6 week. Transition to outpatient PT will occur at the time of the first office visit.   * The patient is FULL weight bearing.  * The patient will continue ASPIRIN for 6 weeks after surgery for blood clot prevention.  ***While on aspirin, the patient will take daily omeprazole or other similar medication to protect the stomach from irritation.   * The patient will take Percocet for pain control and adjust according to prescription and patient needs. Contact the office if pain increases while taking prescribed pain medications or related concerns develop.  * Celebrex will be taken twice daily for 3 weeks for pain control and prevention of excessive bone growth. Additional prescription may be requested at your office follow-up visit.  * The patient will take Senna S while taking percocet to prevent narcotic associated constipation.  Additionally, increase water intake (drink at least 8 glasses of water daily) and try adding fiber to the diet by eating fruits, vegetables and foods that are rich in grains. If constipation is experienced, contact the medical/primary care provider to discuss further treatment options.  * To avoid injury at home:  - continue use of rolling walker until cleared by physical therapist  - have family or friend remove all throw rug or objects in hallways that may present a trip hazard.  - if you experience any dizziness or medical concerns, call your medical doctor or  911.  * The implant may activate metal detection devices.

## 2023-09-06 NOTE — PHYSICAL THERAPY INITIAL EVALUATION ADULT - GAIT TRAINING, PT EVAL
Time Frame: 1-2 days   Goal: Modified Independent with RW x 300 feet / Stairs: pt will navigate 10 stairs independently

## 2023-09-06 NOTE — PROGRESS NOTE ADULT - SUBJECTIVE AND OBJECTIVE BOX
[id number]    KEYANA PALMA    History: Patient seen and eval at bedside. Patient is doing well and is comfortable. The patient's pain is controlled using the prescribed pain medications. The patient is participating in physical therapy. Denies nausea, vomiting, chest pain, shortness of breath, abdominal pain or fever. No new complaints.    T(C): 36.7 (09-06-23 @ 05:22), Max: 37.2 (09-06-23 @ 01:09)  HR: 78 (09-06-23 @ 05:22) (44 - 78)  BP: 108/65 (09-06-23 @ 05:22) (95/55 - 149/77)  RR: 18 (09-06-23 @ 05:22) (11 - 20)  SpO2: 97% (09-06-23 @ 05:22) (93% - 99%)                          11.7   9.18  )-----------( 153      ( 06 Sep 2023 05:35 )             35.4     09-06    140  |  105  |  16.2  ----------------------------<  113<H>  3.9   |  25.0  |  1.04      PE: NAD, alert, awake  Left LE:   Hip dressing C/D/I, no drainage, no bleeding  EHL/TA/FHL/GS intact, DP pulse 2+  Gross sensation to LT intact distally s/s/DP/SP/tib distrib, Calf soft, NT B/L      Primary Orthopedic Assessment:  • s/p LEFT POSTERIOR total hip replacement POD#1    Plan:   • DVT prophylaxis as prescribed -  ASA  including use of compression devices and ankle pumps  •  Continue physical therapy: Weightbearing as tolerated of the left lower extremity with assistance of a walker  • Incentive spirometry encouraged  • Pain control as clinically indicated - Patient requesting percocet to go home with  • Posterior hip precautions   Abduction pillow while in bed  • Discharge planning: home today pending PT and med clearance  
Pelvis & hip films reviewed. Implants are in appropriate position. No fracture or dislocation noted. Patient is WBAT of the surgical extremity. 
KEYANA PALMA    [id number]    History: Patient is status post left posterior total hip arthroplasty POD # 0. Patient is doing well and is comfortable. The patient's pain is controlled using the prescribed pain medications. Denies CP, SOB, dizziness, HA, fever/chills, numbness or tingling. No new complaints.    Vital Signs Last 24 Hrs  T(C): 36.5 (05 Sep 2023 20:55), Max: 36.6 (05 Sep 2023 12:03)  T(F): 97.7 (05 Sep 2023 20:55), Max: 97.8 (05 Sep 2023 12:03)  HR: 72 (05 Sep 2023 20:55) (44 - 75)  BP: 111/76 (05 Sep 2023 20:55) (95/55 - 149/77)  BP(mean): 103 (05 Sep 2023 19:45) (66 - 103)  RR: 14 (05 Sep 2023 20:55) (11 - 20)  SpO2: 94% (05 Sep 2023 20:55) (94% - 99%)    Parameters below as of 05 Sep 2023 20:55  Patient On (Oxygen Delivery Method): room air    General: Alert, awake, NAD, abduction pillow in place  Physical exam: The left hip dressing is clean, dry and intact. No drainage, discharge, erythema, blistering or ecchymosis. The calf is supple nontender b/l. SILT. +EHL/FHL/AT/GC. No foot drop. 2+ DP pulse. BCR. No cyanosis.    Plan:   - DVT prophylaxis as prescribed, including use of compression devices and ankle pumps  -  Continue physical therapy  - Weight bearing status of surgical extremity: WBAT LLE  - Incentive spirometry encouraged  - Pain control as clinically indicated  - Posterior hip precautions reviewed with patient   - ABX per protocol     RN redirected patient multiple times regarding hip precautions and use of abduction pillow, pt continues to remove pillow. I also emphasized need to follow hip precautions and use of abd pillow. Pt agrees

## 2023-09-06 NOTE — DISCHARGE NOTE NURSING/CASE MANAGEMENT/SOCIAL WORK - PATIENT PORTAL LINK FT
You can access the FollowMyHealth Patient Portal offered by [facility] by registering at the following website: [email or web address]. By joining [employer]’s FollowMyHealth portal, you will also be able to view your health information using other applications (apps) compatible with our system.

## 2023-09-06 NOTE — DISCHARGE NOTE PROVIDER - CARE PROVIDER_API CALL
Alphonse Chacon  Orthopaedic Surgery  [address] [place] [id number]  Phone: [phone]  Fax: [phone]  Follow Up Time:

## 2023-09-06 NOTE — DISCHARGE NOTE PROVIDER - HOSPITAL COURSE
The patient underwent a left POSTERIOR TOTAL HIP REPLACEMENT on 9/5/23. The patient received antibiotics consistent with SCIP guidelines. The patient underwent the procedure and had no intra-operative complications. Post-operatively, the patient was seen by medicine and PT. The patient received ASA for DVTP. The patient received pain medications per orthopedic pain managment protocol and the pain was appropriately controlled. Patient was instructed on posterior total hip precautions by PT. The patient did not have any post-operative medical complications. The patient was discharged in stable condition.

## 2023-09-06 NOTE — CONSULT NOTE ADULT - ASSESSMENT
78 yr old male with history of chronic back pain , OA , BIPOLAR , Gout , HTN, current smoker - not willing to quit , high cholesterol , chronic L hip pain .   Pt had right hip replaced in JUly 2022 with DR. Chacon tolerated well with relief , now c/o left hip and groin pain mostly with activity, ambulates without assistance but is limited due to discomfort , 0/10 pain today . Pt has lower back pain that radiates to left buttock , had RAJNI years ago with pain management , currently treats with PCP  takes  Hydrocodone as needed with mild relief.     1. L hip chronic pain / OA,   S/P L ALYCE ,   PT/OT/pain mgmt  DVT prophylaxis- as per ortho  Abx as per SCIP- given   Incentive spirometry  Prophylaxis of opioid  induced constipation.  Wound care / WB status - as per ortho     2. HLD - continue home dose statins       3. Hx of Bipolar - continue home dose of Bipolar    4. Smoker , 1 ppd x60 yrs - not willing to quit     5. DVT prophylaxis  - as per ortho protocol  Opioid induced constipation  prophylaxis - bowel regimen     6. Anemia - acute blood lost anemia - secondary to surgical blood lost , EBL - 250 ml -  not significant - patient is asymptomatic.     Dispo plan is Home - likely today .    Medically stable to d/c once cleared by physical therapy / ortho .    Thank you for the courtesy of this consult ,   will follow along with you .

## 2023-09-06 NOTE — DISCHARGE NOTE NURSING/CASE MANAGEMENT/SOCIAL WORK - NSDCPEFALRISK_GEN_ALL_CORE
For information on Fall & Injury Prevention, visit: [email or web address] OR  [email or web address] OR  https://www.cdc.gov/steadi/patient.html

## 2023-09-15 RX ORDER — OXYCODONE 5 MG/1
5 TABLET ORAL EVERY 6 HOURS
Qty: 28 | Refills: 0 | Status: DISCONTINUED | COMMUNITY
Start: 2023-09-15 | End: 2023-09-15

## 2023-09-15 RX ORDER — OXYCODONE AND ACETAMINOPHEN 5; 325 MG/1; MG/1
5-325 TABLET ORAL
Qty: 28 | Refills: 0 | Status: ACTIVE | COMMUNITY
Start: 2023-09-15 | End: 1900-01-01

## 2023-09-20 DIAGNOSIS — Z96.649 PRESENCE OF UNSPECIFIED ARTIFICIAL HIP JOINT: ICD-10-CM

## 2023-09-25 ENCOUNTER — APPOINTMENT (OUTPATIENT)
Dept: ORTHOPEDIC SURGERY | Facility: CLINIC | Age: 78
End: 2023-09-25
Payer: MEDICARE

## 2023-09-25 PROCEDURE — 73502 X-RAY EXAM HIP UNI 2-3 VIEWS: CPT | Mod: LT

## 2023-09-25 PROCEDURE — 99024 POSTOP FOLLOW-UP VISIT: CPT

## 2023-10-27 ENCOUNTER — APPOINTMENT (OUTPATIENT)
Dept: ORTHOPEDIC SURGERY | Facility: CLINIC | Age: 78
End: 2023-10-27
Payer: MEDICARE

## 2023-10-27 DIAGNOSIS — Z96.642 PRESENCE OF LEFT ARTIFICIAL HIP JOINT: ICD-10-CM

## 2023-10-27 PROCEDURE — 73502 X-RAY EXAM HIP UNI 2-3 VIEWS: CPT

## 2023-10-27 PROCEDURE — 99024 POSTOP FOLLOW-UP VISIT: CPT

## 2023-11-27 ENCOUNTER — APPOINTMENT (OUTPATIENT)
Dept: ORTHOPEDIC SURGERY | Facility: CLINIC | Age: 78
End: 2023-11-27
Payer: MEDICARE

## 2023-11-27 DIAGNOSIS — Z47.1 AFTERCARE FOLLOWING JOINT REPLACEMENT SURGERY: ICD-10-CM

## 2023-11-27 DIAGNOSIS — Z96.641 AFTERCARE FOLLOWING JOINT REPLACEMENT SURGERY: ICD-10-CM

## 2023-11-27 DIAGNOSIS — Z96.642 AFTERCARE FOLLOWING JOINT REPLACEMENT SURGERY: ICD-10-CM

## 2023-11-27 PROCEDURE — 73502 X-RAY EXAM HIP UNI 2-3 VIEWS: CPT

## 2023-11-27 PROCEDURE — 99024 POSTOP FOLLOW-UP VISIT: CPT

## 2024-02-04 NOTE — PHYSICAL THERAPY INITIAL EVALUATION ADULT - RANGE OF MOTION EXAMINATION, REHAB EVAL
bilateral upper extremity ROM was WNL (within normal limits)/Left LE ROM was WNL (within normal limits)/Right LE ROM was WFL (within functional limits)
No indicators present

## 2025-02-06 ENCOUNTER — APPOINTMENT (OUTPATIENT)
Age: 80
End: 2025-02-06
Payer: MEDICARE

## 2025-02-06 DIAGNOSIS — Z47.1 AFTERCARE FOLLOWING JOINT REPLACEMENT SURGERY: ICD-10-CM

## 2025-02-06 DIAGNOSIS — Z96.642 AFTERCARE FOLLOWING JOINT REPLACEMENT SURGERY: ICD-10-CM

## 2025-02-06 PROCEDURE — 99213 OFFICE O/P EST LOW 20 MIN: CPT

## 2025-02-06 PROCEDURE — 73502 X-RAY EXAM HIP UNI 2-3 VIEWS: CPT

## 2025-04-15 ENCOUNTER — APPOINTMENT (OUTPATIENT)
Dept: DERMATOLOGY | Facility: CLINIC | Age: 80
End: 2025-04-15

## 2025-04-16 ENCOUNTER — APPOINTMENT (OUTPATIENT)
Dept: DERMATOLOGY | Facility: CLINIC | Age: 80
End: 2025-04-16
Payer: MEDICARE

## 2025-04-16 PROCEDURE — 99203 OFFICE O/P NEW LOW 30 MIN: CPT

## 2025-05-24 NOTE — DISCHARGE NOTE ADULT - NS DC ANGIO PCI YN
On Call  at: [place]: ext [phone]    Rev. Joan Williamson MDiv     no

## (undated) DEVICE — PACK TOTAL HIP

## (undated) DEVICE — DRSG MEPILEX 10 X 25CM (4 X 10") POST OP AG SILVER

## (undated) DEVICE — DRAPE SPLIT SHEETS 77X108"

## (undated) DEVICE — SOL IRR BAG NS 0.9% 3000ML

## (undated) DEVICE — DRAPE HIP W POUCHES 87X115X134"

## (undated) DEVICE — SAW BLADE ZIMMER RECIPROCATING SINGLE SIDED 10X70X1.19MM

## (undated) DEVICE — SUT ETHIBOND 5 4-30" CCS

## (undated) DEVICE — GLV 8 PROTEXIS ORTHO (BROWN)

## (undated) DEVICE — PACK TOTAL HIP CUST

## (undated) DEVICE — NDL HYPO SAFE 20G X 1.5"

## (undated) DEVICE — ELCTR PENCIL NEPTUNE SMOKE EVACUATION

## (undated) DEVICE — SUT HEWSON RETRIEVER

## (undated) DEVICE — SUT VICRYL 0 18" CT-1 UNDYED (POP-OFF)

## (undated) DEVICE — SOL IRR POUR NS 0.9% 1000ML

## (undated) DEVICE — SOL BETADINE POUCH 0.75OZ STERILE

## (undated) DEVICE — GLV 8 PROTEXIS

## (undated) DEVICE — SUT VICRYL 2-0 27" CT-2 UNDYED

## (undated) DEVICE — SOL IRR POUR H2O 1500ML

## (undated) DEVICE — DRAPE U LONG 47X70"

## (undated) DEVICE — DRSG TEGADERM 6"X8"

## (undated) DEVICE — SUT MONOCRYL 3-0 27" PS-2 UNDYED

## (undated) DEVICE — ZIMMER PULSAVAC PLUS FAN KIT

## (undated) DEVICE — SYR LUER LOK 50CC

## (undated) DEVICE — WOUND IRR SURGIPHOR

## (undated) DEVICE — SUT DERMABOND PRINEO 22CM

## (undated) DEVICE — ELCTR STRYKER NEPTUNE SMOKE EVACUATION PENCIL (GREEN)

## (undated) DEVICE — DRAPE HALF SHEET 40X57"

## (undated) DEVICE — WARMING BLANKET UPPER ADULT

## (undated) DEVICE — SYSTEM CEMENT MIXER COMPACT

## (undated) DEVICE — DRAPE 3/4 SHEET 52X76"

## (undated) DEVICE — DRAPE SHEET XL 77X98"

## (undated) DEVICE — KT PULSAVAC WOUND W/ SPRAYTIP

## (undated) DEVICE — BLANKET WARMER UPPER ADULT

## (undated) DEVICE — GLV 8 PROTEXIS (BLUE)

## (undated) DEVICE — DRILL TWIST 3/32X5IN

## (undated) DEVICE — ELCTR AQUAMANTYS BIPOLAR SEALER 6.0

## (undated) DEVICE — DRSG DERMABOND PRINEO 60CM

## (undated) DEVICE — DRAPE 1/2 SHEET 40X57"

## (undated) DEVICE — HOOD FLYTE STRYKER SURGICOOL W PEELAWAY

## (undated) DEVICE — SOL IRR POUR H2O 1000ML

## (undated) DEVICE — SOL IRR POUR NS 0.9% 500ML

## (undated) DEVICE — ELCTR GROUNDING PAD ADULT COVIDIEN

## (undated) DEVICE — NDL HYPO SAFE 20G X 1.5" (YELLOW)

## (undated) DEVICE — SEALER BIPOLAR 6.0 AQUAMANTYS

## (undated) DEVICE — DRILL BIT BRASSELER TWIST 2.4MM 3/32 X 5"

## (undated) DEVICE — DRAPE XL SHEET 77X98"

## (undated) DEVICE — SUT VICRYL 0 18" CT-1 UNDYED